# Patient Record
Sex: FEMALE | Race: WHITE | NOT HISPANIC OR LATINO
[De-identification: names, ages, dates, MRNs, and addresses within clinical notes are randomized per-mention and may not be internally consistent; named-entity substitution may affect disease eponyms.]

---

## 2017-03-01 RX ORDER — HYALURONATE SODIUM 20 MG/2 ML
20 SYRINGE (ML) INTRAARTICULAR
Qty: 2 | Refills: 0 | Status: ACTIVE | OUTPATIENT
Start: 2017-03-01

## 2017-03-06 ENCOUNTER — APPOINTMENT (OUTPATIENT)
Dept: ORTHOPEDIC SURGERY | Facility: CLINIC | Age: 82
End: 2017-03-06

## 2017-03-06 VITALS
WEIGHT: 172 LBS | HEART RATE: 77 BPM | SYSTOLIC BLOOD PRESSURE: 119 MMHG | DIASTOLIC BLOOD PRESSURE: 77 MMHG | HEIGHT: 62 IN | BODY MASS INDEX: 31.65 KG/M2

## 2017-03-13 ENCOUNTER — APPOINTMENT (OUTPATIENT)
Dept: ORTHOPEDIC SURGERY | Facility: CLINIC | Age: 82
End: 2017-03-13

## 2017-03-13 VITALS — HEART RATE: 83 BPM | DIASTOLIC BLOOD PRESSURE: 78 MMHG | SYSTOLIC BLOOD PRESSURE: 118 MMHG

## 2017-03-20 ENCOUNTER — APPOINTMENT (OUTPATIENT)
Dept: ORTHOPEDIC SURGERY | Facility: CLINIC | Age: 82
End: 2017-03-20

## 2017-03-20 VITALS — SYSTOLIC BLOOD PRESSURE: 122 MMHG | DIASTOLIC BLOOD PRESSURE: 78 MMHG | HEART RATE: 64 BPM

## 2018-01-30 ENCOUNTER — APPOINTMENT (OUTPATIENT)
Dept: OTOLARYNGOLOGY | Facility: CLINIC | Age: 83
End: 2018-01-30

## 2018-03-05 ENCOUNTER — APPOINTMENT (OUTPATIENT)
Dept: OTOLARYNGOLOGY | Facility: CLINIC | Age: 83
End: 2018-03-05
Payer: MEDICARE

## 2018-03-05 VITALS
HEART RATE: 70 BPM | DIASTOLIC BLOOD PRESSURE: 79 MMHG | WEIGHT: 172 LBS | BODY MASS INDEX: 31.65 KG/M2 | SYSTOLIC BLOOD PRESSURE: 127 MMHG | HEIGHT: 62 IN

## 2018-03-05 PROCEDURE — 69210 REMOVE IMPACTED EAR WAX UNI: CPT

## 2018-03-05 PROCEDURE — 99213 OFFICE O/P EST LOW 20 MIN: CPT | Mod: 25

## 2018-03-05 RX ORDER — SIMVASTATIN 5 MG/1
5 TABLET, FILM COATED ORAL
Qty: 90 | Refills: 0 | Status: ACTIVE | COMMUNITY
Start: 2017-10-09

## 2018-03-05 RX ORDER — DICLOFENAC SODIUM 3 G/100G
3 GEL TOPICAL
Qty: 200 | Refills: 0 | Status: ACTIVE | COMMUNITY
Start: 2017-12-27

## 2018-03-05 RX ORDER — POTASSIUM CHLORIDE 750 MG/1
10 TABLET, FILM COATED, EXTENDED RELEASE ORAL
Qty: 90 | Refills: 0 | Status: ACTIVE | COMMUNITY
Start: 2018-02-02

## 2018-03-05 RX ORDER — CEFDINIR 300 MG/1
300 CAPSULE ORAL
Qty: 20 | Refills: 0 | Status: ACTIVE | COMMUNITY
Start: 2017-09-04

## 2018-03-05 RX ORDER — DICLOFENAC SODIUM 20 MG/G
2 SOLUTION TOPICAL
Qty: 112 | Refills: 0 | Status: ACTIVE | COMMUNITY
Start: 2017-11-10

## 2018-10-01 ENCOUNTER — APPOINTMENT (OUTPATIENT)
Dept: ORTHOPEDIC SURGERY | Facility: CLINIC | Age: 83
End: 2018-10-01
Payer: MEDICARE

## 2018-10-01 VITALS
SYSTOLIC BLOOD PRESSURE: 114 MMHG | WEIGHT: 172 LBS | BODY MASS INDEX: 31.65 KG/M2 | HEIGHT: 62 IN | HEART RATE: 70 BPM | DIASTOLIC BLOOD PRESSURE: 80 MMHG

## 2018-10-01 PROCEDURE — 99213 OFFICE O/P EST LOW 20 MIN: CPT | Mod: 25

## 2018-10-01 PROCEDURE — 20610 DRAIN/INJ JOINT/BURSA W/O US: CPT | Mod: 50

## 2018-10-08 ENCOUNTER — APPOINTMENT (OUTPATIENT)
Dept: ORTHOPEDIC SURGERY | Facility: CLINIC | Age: 83
End: 2018-10-08
Payer: MEDICARE

## 2018-10-08 VITALS
SYSTOLIC BLOOD PRESSURE: 118 MMHG | DIASTOLIC BLOOD PRESSURE: 82 MMHG | BODY MASS INDEX: 31.65 KG/M2 | HEART RATE: 72 BPM | WEIGHT: 172 LBS | HEIGHT: 62 IN

## 2018-10-08 PROCEDURE — 20610 DRAIN/INJ JOINT/BURSA W/O US: CPT | Mod: 50

## 2018-10-16 ENCOUNTER — APPOINTMENT (OUTPATIENT)
Dept: ORTHOPEDIC SURGERY | Facility: CLINIC | Age: 83
End: 2018-10-16
Payer: MEDICARE

## 2018-10-16 PROCEDURE — 20610 DRAIN/INJ JOINT/BURSA W/O US: CPT | Mod: 50

## 2018-12-19 ENCOUNTER — APPOINTMENT (OUTPATIENT)
Dept: OTOLARYNGOLOGY | Facility: CLINIC | Age: 83
End: 2018-12-19
Payer: MEDICARE

## 2018-12-19 VITALS
WEIGHT: 172 LBS | BODY MASS INDEX: 31.65 KG/M2 | HEIGHT: 62 IN | SYSTOLIC BLOOD PRESSURE: 125 MMHG | DIASTOLIC BLOOD PRESSURE: 78 MMHG | HEART RATE: 75 BPM

## 2018-12-19 PROCEDURE — 69210 REMOVE IMPACTED EAR WAX UNI: CPT

## 2018-12-19 PROCEDURE — 99213 OFFICE O/P EST LOW 20 MIN: CPT | Mod: 25

## 2019-05-06 ENCOUNTER — APPOINTMENT (OUTPATIENT)
Dept: ORTHOPEDIC SURGERY | Facility: CLINIC | Age: 84
End: 2019-05-06
Payer: MEDICARE

## 2019-05-06 VITALS
BODY MASS INDEX: 31.47 KG/M2 | DIASTOLIC BLOOD PRESSURE: 66 MMHG | SYSTOLIC BLOOD PRESSURE: 124 MMHG | WEIGHT: 171 LBS | HEART RATE: 73 BPM | HEIGHT: 62 IN

## 2019-05-06 PROCEDURE — 99213 OFFICE O/P EST LOW 20 MIN: CPT | Mod: 25

## 2019-05-06 PROCEDURE — 20610 DRAIN/INJ JOINT/BURSA W/O US: CPT | Mod: 50

## 2019-05-06 NOTE — DISCUSSION/SUMMARY
[de-identified] : The patient has symptomatic osteoarthritis of both knees. I have discussed the pathology, natural history and treatment options with her. She would like to try a course of hyaluronic acid.\par Informed consent was obtained. Site and procedure were confirmed with the patient. Following a sterile prep the right knee was aspirated but no fluid was returned. Euflexxa was injected in the right knee without complication. Sterile dressing was applied. Instructions were given.\par Informed consent was obtained.Site and procedure were confirmed with the patient.  Following a sterile prep the left knee was aspirated but no fluid was returned. Euflexxa was injected in the left knee without complication. Sterile dressing was applied. Instructions were given.\par She will return in one week.

## 2019-05-06 NOTE — HISTORY OF PRESENT ILLNESS
[de-identified] : This patient returns for evaluation of her knees. She has had success in the past with hyaluronic acid injections and would like to try another course of treatment. She has bilateral knee pain. She denies lower extremity numbness or tingling. She denies buckling or locking of her knees.

## 2019-05-06 NOTE — PHYSICAL EXAM
[Normal] : Gait: normal [Ankle] : ankle 2+ and symmetric bilaterally [Knee] : patellar 2+ and symmetric bilaterally [LE] : Sensory: Intact in bilateral lower extremities [de-identified] : The patient has no respiratory distress. Mood and affect are normal. The patient is alert and oriented to person, place and time.\par The patient reports no pain with motion of her hips. There is mild swelling of both knees but no joint effusion. The collateral and cruciate ligaments are stable bilaterally. She has medial tenderness of both knees. Range of motion 0-100° right and left. The calves are soft and nontender. The skin is intact. There is no lymphedema. [DP] : dorsalis pedis 2+ and symmetric bilaterally

## 2019-05-13 ENCOUNTER — APPOINTMENT (OUTPATIENT)
Dept: ORTHOPEDIC SURGERY | Facility: CLINIC | Age: 84
End: 2019-05-13
Payer: MEDICARE

## 2019-05-13 VITALS
WEIGHT: 171 LBS | SYSTOLIC BLOOD PRESSURE: 118 MMHG | DIASTOLIC BLOOD PRESSURE: 75 MMHG | HEART RATE: 57 BPM | HEIGHT: 62 IN | BODY MASS INDEX: 31.47 KG/M2

## 2019-05-13 PROCEDURE — 20610 DRAIN/INJ JOINT/BURSA W/O US: CPT | Mod: 50

## 2019-05-20 ENCOUNTER — APPOINTMENT (OUTPATIENT)
Dept: ORTHOPEDIC SURGERY | Facility: CLINIC | Age: 84
End: 2019-05-20
Payer: MEDICARE

## 2019-05-20 VITALS
HEIGHT: 62 IN | HEART RATE: 73 BPM | DIASTOLIC BLOOD PRESSURE: 68 MMHG | SYSTOLIC BLOOD PRESSURE: 123 MMHG | WEIGHT: 171 LBS | BODY MASS INDEX: 31.47 KG/M2

## 2019-05-20 PROCEDURE — 20610 DRAIN/INJ JOINT/BURSA W/O US: CPT | Mod: 50

## 2019-09-05 ENCOUNTER — APPOINTMENT (OUTPATIENT)
Dept: OTOLARYNGOLOGY | Facility: CLINIC | Age: 84
End: 2019-09-05
Payer: MEDICARE

## 2019-09-05 VITALS
SYSTOLIC BLOOD PRESSURE: 117 MMHG | WEIGHT: 168 LBS | DIASTOLIC BLOOD PRESSURE: 68 MMHG | HEART RATE: 68 BPM | HEIGHT: 62 IN | BODY MASS INDEX: 30.91 KG/M2

## 2019-09-05 PROCEDURE — 69210 REMOVE IMPACTED EAR WAX UNI: CPT

## 2019-09-05 PROCEDURE — 99213 OFFICE O/P EST LOW 20 MIN: CPT | Mod: 25

## 2019-09-05 NOTE — ASSESSMENT
[FreeTextEntry1] : 88 y/o female w/ b/l SNHL and excessive wax who is here fir her routine debridement. SHe has been doing well.\par \par Wax-\par After informed verbal consent is obtained, cerumen is removed from the right and left  ear canal with a curette and suction.\par Rx: \par Debrox was prescribed and  is to be placed in both ears on a routine basis to keep them free of wax.\par Routine debridement suggested to keep the ears free of wax.\par \par bilateral sensorineural hearing loss-cleared for hearing aids\par \par Will f/u 6 months \par

## 2019-09-05 NOTE — PHYSICAL EXAM
[] : septum deviated bilaterally [Midline] : trachea located in midline position [Normal] : salivary glands are normal [de-identified] : bilat wax

## 2019-09-05 NOTE — REVIEW OF SYSTEMS
[Patient Intake Form Reviewed] : Patient intake form was reviewed [Hearing Loss] : hearing loss [As Noted in HPI] : as noted in HPI [Negative] : Endocrine

## 2019-09-05 NOTE — HISTORY OF PRESENT ILLNESS
[No] : patient does not have a  history of radiation therapy [Hearing Loss] : hearing loss [None] : The patient is currently asymptomatic. [de-identified] : 88 y/o female w/ hx of b/l hearing loss with associated severe cerumen impaction x yrs who is here for her routine wax debridement. She states she has been doing well. She has no change in her hearing. She has no ENT complaints today. Pt has no ear pain, ear drainage,  tinnitus, vertigo, nasal congestion, nasal discharge, epistaxis, sinus infections, facial pain, facial pressure, throat pain, dysphagia or fevers\par  [Tinnitus] : no tinnitus [Anxiety] : no anxiety [Dizziness] : no dizziness [Headache] : no headache [Otalgia] : no otalgia [Otorrhea] : no otorrhea [Vertigo] : no vertigo [Loud Noise Exposure] : no history of loud noise exposure [Smoking] : no smoking [Early Onset Hearing Loss] : no early onset hearing loss [Stroke] : no stroke [Ear Pain] : no ear pain [Ear Pressure] : no ear pressure [Diplopia] : no diplopia [Ear Fullness] : no ear fullness [Allergic Rhinitis] : no allergic rhinitis [Environmental Allergies] : no environmental allergies [Seasonal Allergies] : no seasonal allergies [Environmental Allergens] : no environmental allergens [Adenoidectomy] : no adenoidectomy [Asthma] : no asthma [Allergies] : no allergies [Neck Mass] : no neck mass [Neck Pain] : no neck pain [Chills] : no chills [Cold Intolerance] : no cold intolerance [Cough] : no cough [Fatigue] : no fatigue [Heat Intolerance] : no heat intolerance [Hyperthyroidism] : no hyperthyroidism [Sialadenitis] : no sialadenitis [Hodgkin Disease] : no hodgkin disease [Tobacco Use] : no tobacco use [Non-Hodgkin Lymphoma] : no non-hodgkin lymphoma [Alcohol Use] : no alcohol use [Graves Disease] : no graves disease [Thyroid Cancer] : no thyroid cancer

## 2019-09-05 NOTE — REASON FOR VISIT
[Subsequent Evaluation] : a subsequent evaluation for [Hearing Loss] : hearing loss [FreeTextEntry2] : wax

## 2019-10-03 ENCOUNTER — APPOINTMENT (OUTPATIENT)
Dept: ORTHOPEDIC SURGERY | Facility: CLINIC | Age: 84
End: 2019-10-03
Payer: MEDICARE

## 2019-10-03 VITALS
HEIGHT: 62 IN | WEIGHT: 168 LBS | BODY MASS INDEX: 30.91 KG/M2 | DIASTOLIC BLOOD PRESSURE: 87 MMHG | HEART RATE: 70 BPM | SYSTOLIC BLOOD PRESSURE: 148 MMHG

## 2019-10-03 PROCEDURE — 99214 OFFICE O/P EST MOD 30 MIN: CPT

## 2019-10-03 PROCEDURE — 73502 X-RAY EXAM HIP UNI 2-3 VIEWS: CPT | Mod: RT

## 2019-10-03 PROCEDURE — 72100 X-RAY EXAM L-S SPINE 2/3 VWS: CPT

## 2019-10-03 NOTE — REVIEW OF SYSTEMS
[Joint Stiffness] : joint stiffness [Joint Pain] : joint pain [Joint Swelling] : no joint swelling [Negative] : Heme/Lymph

## 2019-10-03 NOTE — PHYSICAL EXAM
[DP] : dorsalis pedis 2+ and symmetric bilaterally [PT] : posterior tibial 2+ and symmetric bilaterally [Normal] : Alert and in no acute distress [Poor Appearance] : well-appearing [Acute Distress] : not in acute distress [Obese] : not obese [de-identified] : AP and lateral x-rays of the right hip with AP x-ray of the pelvis demonstrates osteoarthritic changes of the right hip.  There are no fractures or dislocations.  AP and lateral x-rays of the lumbar spine demonstrate no acute fracture or dislocation.  There are degenerative changes with spondylolisthesis at L3-L4 [de-identified] : The patient has no respiratory distress. Mood and affect are normal. The patient is alert and oriented to person, place and time.\par Examination of the lumbar spine demonstrates tenderness to the right of the midline. There is mild muscle spasm. There is no deformity. Lumbar flexion is 90°, right lateral flexion 10° and left lateral flexion 10°. Straight leg raise test is negative. Lower extremity neurologic exam is intact with regard to sensation, motor function and deep tendon reflexes.\par She difficulty performing Trendelenburg.  There is pain with rotation of the right hip but not the left.  Strength is intact at both hips.  There is pain with knee range of motion bilaterally.  The calves are soft and nontender.  The skin is intact.  There is no lymphedema.

## 2019-10-03 NOTE — HISTORY OF PRESENT ILLNESS
[de-identified] : 89 year old female presents with one year of right buttock pain radiating to the right thigh, gradually worsening. The pain is constant. There was no injury or trauma. Years ago she was told she had a collapsed disc in her lumbar spine. She reports occasional pain in the right groin. Tylenol, ice and heat have not helped. Her granddaughter is getting  this month and she would like to be comfortable.

## 2019-10-03 NOTE — DISCUSSION/SUMMARY
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  I have discussed the pathology and natural history with her.  Treatment options were discussed in detail.  She will try a course of naproxen.  Medication risks have been reviewed.  If this is not helpful she could consider injection therapy for her hip.

## 2020-01-02 ENCOUNTER — APPOINTMENT (OUTPATIENT)
Dept: ORTHOPEDIC SURGERY | Facility: CLINIC | Age: 85
End: 2020-01-02
Payer: MEDICARE

## 2020-01-02 VITALS
SYSTOLIC BLOOD PRESSURE: 135 MMHG | DIASTOLIC BLOOD PRESSURE: 80 MMHG | BODY MASS INDEX: 30.91 KG/M2 | WEIGHT: 168 LBS | HEIGHT: 62 IN | HEART RATE: 80 BPM

## 2020-01-02 PROCEDURE — 73564 X-RAY EXAM KNEE 4 OR MORE: CPT | Mod: 50

## 2020-01-02 PROCEDURE — 20610 DRAIN/INJ JOINT/BURSA W/O US: CPT | Mod: 50

## 2020-01-02 PROCEDURE — 99213 OFFICE O/P EST LOW 20 MIN: CPT | Mod: 25

## 2020-01-02 NOTE — HISTORY OF PRESENT ILLNESS
[de-identified] : Pt is a 89 y/o female with bilateral knee pain.  She had Euflexxa injections in May which helped.  The knees started to become painful again about 1 month ago.  The knees ache constantly.  She has pain when she stands and walks.  It is difficult to stand from sitting.  She would like to start another series of Euflexxa today.  She takes Advil or Tylenol as needed for pain.

## 2020-01-02 NOTE — DISCUSSION/SUMMARY
[de-identified] : The patient has bilateral, symptomatic arthritis of both knees.  She has benefited in the past from hyaluronic acid injections and requests another series.  The risks, benefits and alternatives were discussed.  She wishes to proceed.\par Informed consent was obtained. Site and procedure were confirmed with the patient. Following a sterile prep the right knee was aspirated but no fluid was returned. Euflexxa was injected in the right knee without complication. Sterile dressing was applied. Instructions were given.\par Informed consent was obtained.Site and procedure were confirmed with the patient.  Following a sterile prep the left knee was aspirated but no fluid was returned. Euflexxa was injected in the left knee without complication. Sterile dressing was applied. Instructions were given.\par She will return in 1 week.

## 2020-01-02 NOTE — PHYSICAL EXAM
[LE] : Sensory: Intact in bilateral lower extremities [DP] : dorsalis pedis 2+ and symmetric bilaterally [PT] : posterior tibial 2+ and symmetric bilaterally [Normal] : Alert and in no acute distress [Poor Appearance] : well-appearing [Acute Distress] : not in acute distress [Obese] : not obese [de-identified] : The patient has no respiratory distress. Mood and affect are normal. The patient is alert and oriented to person, place and time.\par The patient experiences no pain with motion of the hips.  There is no tenderness of her hips.  She has anteromedial tenderness of both knees.  Quadriceps and hamstring function are intact.  The collateral and cruciate ligaments are stable.  Range of motion 0 to 100 degrees bilaterally.  The calves are soft and nontender.  The skin is intact.  There is no lymphedema. [de-identified] : AP, lateral, tunnel and sunrise x-rays of both knees taken today demonstrate no fracture or dislocation.  There are degenerative changes.

## 2020-01-09 ENCOUNTER — APPOINTMENT (OUTPATIENT)
Dept: ORTHOPEDIC SURGERY | Facility: CLINIC | Age: 85
End: 2020-01-09
Payer: MEDICARE

## 2020-01-09 VITALS
HEIGHT: 62 IN | HEART RATE: 69 BPM | DIASTOLIC BLOOD PRESSURE: 68 MMHG | WEIGHT: 168 LBS | BODY MASS INDEX: 30.91 KG/M2 | SYSTOLIC BLOOD PRESSURE: 116 MMHG

## 2020-01-09 PROCEDURE — 20610 DRAIN/INJ JOINT/BURSA W/O US: CPT | Mod: 50

## 2020-01-09 NOTE — PHYSICAL EXAM
[PT] : posterior tibial 2+ and symmetric bilaterally [DP] : dorsalis pedis 2+ and symmetric bilaterally [LE] : 5/5 motor strength in bilateral lower extremities [Normal] : Alert and in no acute distress [Poor Appearance] : well-appearing [de-identified] : The patient has no respiratory distress. Mood and affect are normal. The patient is alert and oriented to person, place and time.\par The patient experiences no pain with motion of the hips.  There is no tenderness of her hips.  She has anteromedial tenderness of both knees.  Quadriceps and hamstring function are intact.  The collateral and cruciate ligaments are stable.  Range of motion 0 to 100 degrees bilaterally.  The calves are soft and nontender.  The skin is intact.  There is no lymphedema. [Acute Distress] : not in acute distress [Obese] : not obese

## 2020-01-09 NOTE — DISCUSSION/SUMMARY
[de-identified] : Informed consent was obtained. Site and procedure were confirmed with the patient. Following a sterile prep the right knee was aspirated but no fluid was returned. Euflexxa was injected in the right knee without complication. Sterile dressing was applied. Instructions were given.\par Informed consent was obtained.Site and procedure were confirmed with the patient.  Following a sterile prep the left knee was aspirated but no fluid was returned. Euflexxa was injected in the left knee without complication. Sterile dressing was applied. Instructions were given.\par She will return in 1 week.

## 2020-01-09 NOTE — HISTORY OF PRESENT ILLNESS
[de-identified] : The patient presents for second hyaluronic acid injection for each knee.  The first injections were well tolerated.  She still experiences bilateral knee pain.

## 2020-01-16 ENCOUNTER — APPOINTMENT (OUTPATIENT)
Dept: ORTHOPEDIC SURGERY | Facility: CLINIC | Age: 85
End: 2020-01-16
Payer: MEDICARE

## 2020-01-16 VITALS
HEART RATE: 101 BPM | WEIGHT: 168 LBS | BODY MASS INDEX: 30.91 KG/M2 | DIASTOLIC BLOOD PRESSURE: 70 MMHG | SYSTOLIC BLOOD PRESSURE: 112 MMHG | HEIGHT: 62 IN

## 2020-01-16 PROCEDURE — 20610 DRAIN/INJ JOINT/BURSA W/O US: CPT | Mod: 50

## 2020-01-16 NOTE — DISCUSSION/SUMMARY
[de-identified] : Informed consent was obtained. Site and procedure were confirmed with the patient. Following a sterile prep the right knee was aspirated but no fluid was returned. Euflexxa was injected in the right knee without complication. Sterile dressing was applied. Instructions were given.\par Informed consent was obtained.Site and procedure were confirmed with the patient.  Following a sterile prep the left knee was aspirated but no fluid was returned. Euflexxa was injected in the left knee without complication. Sterile dressing was applied. Instructions were given.\par She will return as needed.

## 2020-01-16 NOTE — HISTORY OF PRESENT ILLNESS
[de-identified] : The patient presents for her third hyaluronic acid injection for each knee.  The previous injections were well tolerated.

## 2020-01-16 NOTE — PHYSICAL EXAM
[LE] : 5/5 motor strength in bilateral lower extremities [DP] : dorsalis pedis 2+ and symmetric bilaterally [PT] : posterior tibial 2+ and symmetric bilaterally [Normal] : Alert and in no acute distress [Poor Appearance] : well-appearing [Acute Distress] : not in acute distress [Obese] : not obese [de-identified] : The patient has no respiratory distress. Mood and affect are normal. The patient is alert and oriented to person, place and time.\par The patient experiences no pain with motion of the hips.  There is no tenderness of her hips.  She has anteromedial tenderness of both knees.  Quadriceps and hamstring function are intact.  The collateral and cruciate ligaments are stable.  Range of motion 0 to 100 degrees bilaterally.  The calves are soft and nontender.  The skin is intact.  There is no lymphedema.

## 2020-03-05 ENCOUNTER — APPOINTMENT (OUTPATIENT)
Dept: OTOLARYNGOLOGY | Facility: CLINIC | Age: 85
End: 2020-03-05

## 2020-10-19 ENCOUNTER — APPOINTMENT (OUTPATIENT)
Dept: ORTHOPEDIC SURGERY | Facility: CLINIC | Age: 85
End: 2020-10-19
Payer: MEDICARE

## 2020-10-19 VITALS
SYSTOLIC BLOOD PRESSURE: 145 MMHG | TEMPERATURE: 97.9 F | WEIGHT: 168 LBS | HEART RATE: 91 BPM | HEIGHT: 62 IN | BODY MASS INDEX: 30.91 KG/M2 | DIASTOLIC BLOOD PRESSURE: 77 MMHG

## 2020-10-19 PROCEDURE — 20610 DRAIN/INJ JOINT/BURSA W/O US: CPT | Mod: 50

## 2020-10-24 ENCOUNTER — TRANSCRIPTION ENCOUNTER (OUTPATIENT)
Age: 85
End: 2020-10-24

## 2020-10-26 ENCOUNTER — APPOINTMENT (OUTPATIENT)
Dept: ORTHOPEDIC SURGERY | Facility: CLINIC | Age: 85
End: 2020-10-26
Payer: MEDICARE

## 2020-10-26 VITALS
SYSTOLIC BLOOD PRESSURE: 139 MMHG | HEART RATE: 88 BPM | WEIGHT: 168 LBS | TEMPERATURE: 97.8 F | DIASTOLIC BLOOD PRESSURE: 75 MMHG | BODY MASS INDEX: 30.91 KG/M2 | HEIGHT: 62 IN

## 2020-10-26 PROCEDURE — 20610 DRAIN/INJ JOINT/BURSA W/O US: CPT | Mod: 50

## 2020-10-30 ENCOUNTER — NON-APPOINTMENT (OUTPATIENT)
Age: 85
End: 2020-10-30

## 2020-10-30 ENCOUNTER — APPOINTMENT (OUTPATIENT)
Dept: OPHTHALMOLOGY | Facility: CLINIC | Age: 85
End: 2020-10-30
Payer: MEDICARE

## 2020-10-30 PROCEDURE — 92014 COMPRE OPH EXAM EST PT 1/>: CPT

## 2020-11-02 ENCOUNTER — APPOINTMENT (OUTPATIENT)
Dept: ORTHOPEDIC SURGERY | Facility: CLINIC | Age: 85
End: 2020-11-02
Payer: MEDICARE

## 2020-11-02 VITALS
BODY MASS INDEX: 30.91 KG/M2 | WEIGHT: 168 LBS | TEMPERATURE: 97.6 F | HEIGHT: 62 IN | SYSTOLIC BLOOD PRESSURE: 156 MMHG | HEART RATE: 98 BPM | DIASTOLIC BLOOD PRESSURE: 82 MMHG

## 2020-11-02 PROCEDURE — 20610 DRAIN/INJ JOINT/BURSA W/O US: CPT | Mod: 50

## 2020-11-02 NOTE — DISCUSSION/SUMMARY
[de-identified] : Informed consent was obtained. Site and procedure were confirmed with the patient. Following a sterile prep the right knee was aspirated but no fluid was returned. Euflexxa was injected in the right knee without complication. Sterile dressing was applied. Instructions were given.\par Informed consent was obtained.Site and procedure were confirmed with the patient.  Following a sterile prep the left knee was aspirated but no fluid was returned. Euflexxa was injected in the left knee without complication. Sterile dressing was applied. Instructions were given.\par She will return as needed.

## 2020-11-02 NOTE — HISTORY OF PRESENT ILLNESS
[de-identified] : The patient returns for reevaluation of her knees.  She is here for the third hyaluronic acid injection for both knees.  Previous injections were well-tolerated.  She has been started on Mobic by her neurologist for spinal stenosis.

## 2020-11-02 NOTE — PHYSICAL EXAM
[LE] : Sensory: Intact in bilateral lower extremities [DP] : dorsalis pedis 2+ and symmetric bilaterally [PT] : posterior tibial 2+ and symmetric bilaterally [Normal] : Alert and in no acute distress [Poor Appearance] : well-appearing [Acute Distress] : not in acute distress [Obese] : not obese [de-identified] : The patient has no respiratory distress. Mood and affect are normal. The patient is alert and oriented to person, place and time.\par The patient experiences back pain and right leg pain with lumbar spine motion.\par The patient experiences no pain with motion of the hips.  There is no tenderness of her hips.  She has anteromedial tenderness of both knees.  Quadriceps and hamstring function are intact.  The collateral and cruciate ligaments are stable.  Range of motion 0 to 100 degrees bilaterally.  The calves are soft and nontender.  The skin is intact.  There is no lymphedema.

## 2021-07-30 ENCOUNTER — APPOINTMENT (OUTPATIENT)
Dept: ORTHOPEDIC SURGERY | Facility: CLINIC | Age: 86
End: 2021-07-30
Payer: MEDICARE

## 2021-07-30 VITALS
HEART RATE: 82 BPM | WEIGHT: 168 LBS | HEIGHT: 62 IN | BODY MASS INDEX: 30.91 KG/M2 | SYSTOLIC BLOOD PRESSURE: 122 MMHG | DIASTOLIC BLOOD PRESSURE: 76 MMHG

## 2021-07-30 PROCEDURE — 99213 OFFICE O/P EST LOW 20 MIN: CPT

## 2021-07-30 NOTE — HISTORY OF PRESENT ILLNESS
[de-identified] : 89 year old female c/o pain in the lower back radiating to the right hip and thigh, worse over the last 3 months. She was diagnosed with spinal stenosis one year ago and was referred by her neurologist to pain management. She has had 3 LEYLA, most recently 7/14, with minimal relief. Her right leg castro frequently. She is taking Tylenol or Advil with minimal relief. She has difficulty getting out of a chair and difficulty climbing steps. She feels that her QOL has diminished.

## 2021-07-30 NOTE — DISCUSSION/SUMMARY
[de-identified] : The patient feels unstable when she walks.  This may be secondary to her spinal stenosis, her right knee arthritis or generalized lower extremity weakness.  She walks with a cane for support.  She has had epidural injections but is not sure they helped her.  I recommend consultation with the spine service.

## 2021-07-30 NOTE — PHYSICAL EXAM
[Cane] : ambulates with cane [Normal] : Alert and in no acute distress [Poor Appearance] : well-appearing [Acute Distress] : not in acute distress [de-identified] : The patient has no respiratory distress. Mood and affect are normal. The patient is alert and oriented to person, place and time.\par The patient has tenderness in the right lower paralumbar region.  She experiences pain with lumbar flexion.  Straight leg raise does not create leg pain but does create right buttock pain.  There is mild pain with rotation of the right hip.  There is no hip tenderness.  She has quadriceps weakness on the right.  She has mild pain with motion of both knees.  The calves are soft and nontender.  The skin is intact.  There is no lymphedema.

## 2021-08-10 ENCOUNTER — APPOINTMENT (OUTPATIENT)
Dept: ORTHOPEDIC SURGERY | Facility: CLINIC | Age: 86
End: 2021-08-10
Payer: MEDICARE

## 2021-08-10 VITALS
WEIGHT: 160 LBS | BODY MASS INDEX: 29.44 KG/M2 | DIASTOLIC BLOOD PRESSURE: 77 MMHG | HEIGHT: 62 IN | HEART RATE: 90 BPM | SYSTOLIC BLOOD PRESSURE: 128 MMHG

## 2021-08-10 PROCEDURE — 99215 OFFICE O/P EST HI 40 MIN: CPT

## 2021-08-10 PROCEDURE — 72170 X-RAY EXAM OF PELVIS: CPT

## 2021-08-10 RX ORDER — IBUPROFEN 800 MG/1
800 TABLET, FILM COATED ORAL
Qty: 90 | Refills: 0 | Status: ACTIVE | COMMUNITY
Start: 2021-08-10 | End: 1900-01-01

## 2021-08-11 NOTE — DISCUSSION/SUMMARY
[Medication Risks Reviewed] : Medication risks reviewed [de-identified] : Her symptoms are secondary to osteoarthritis in the right hip.  We will pursue a trial of ibuprofen 800 mg 3 times a day as a nonsteroidal anti-inflammatory.  She is asked that it be sent to her mail order.  I have asked her to make a follow-up appointment in 4-1/2 weeks.  If she has seen no improvement in 3-1/2 weeks I have asked her to cancel that appointment and make an appointment to see Dr. Daniel as it may be the only solution to her problem is a right total hip replacement.  If she has made improvement on the ibuprofen she will follow-up with me as planned.  She will call if there are problems with the medication or worsening of her symptoms.

## 2021-08-11 NOTE — PHYSICAL EXAM
[de-identified] : She is fully alert and oriented with a normal mood and affect.  She is in no acute distress as I take the history.  With ambulation she favors her right leg and there is a mildly antalgic component to her gait.  She can tiptoe and heel walk.  There are no cutaneous abnormalities or palpable bony defects of the spine.  There is no evidence of shortness of breath or respiratory distress.  There is no paravertebral muscle spasm, sciatic notch tenderness or trochanteric tenderness.  Forward flexion of the spine is to 60 degrees without pain.  Her lower extremity neurological examination revealed trace symmetrical reflexes.  Motor power is normal manual testing in all lower extremity groups and sensation is normal to light touch in all dermatomes.  Straight leg raising is negative to 90 degrees in the sitting position.  The pain in her buttock, groin and anterior thigh is reproduced with range of motion of her right hip.  Left hip range of motion is full and painless.  Vascular examination reveals some mild superficial varicosities.  There is no lymphedema.  There are no cutaneous abnormalities of the upper or lower extremities.  Her upper extremities are normal to inspection and her elbows have a full and painless range of motion with normal motor power normal stability. [de-identified] : I reviewed her prior x-ray of the pelvis from 2012 and the hips were normal.  X-ray of the pelvis in October 2019 revealed some mild osteoarthritis in the right hip.  AP of the pelvis today reveals marked osteoarthritis in the right hip with collapse of the femoral head.

## 2021-08-11 NOTE — REASON FOR VISIT
[Initial Visit] : an initial visit for [Back Pain] : back pain [Radiculopathy] : radiculopathy [FreeTextEntry2] : Osteoarthritis right hip

## 2021-08-11 NOTE — HISTORY OF PRESENT ILLNESS
[de-identified] : This sharp and pleasant 89-year-old woman has had several months of pain in the right buttock radiating to the groin and the anterolateral thigh to the knee but not below.  She has been diagnosed with spinal stenosis and has had epidurals with minimal relief.  She has minimal lower back pain.  There is no Valsalva effect.  She has been limping.  She has some chronic numbness and paresthesias secondary to neuropathy in her lower legs.  She has been diabetic for the last 5 years but is not on any diabetic medication.  She is on medication for hypertension.  She has reflux symptoms that are well controlled with omeprazole.  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  She has been limping and feels like her right hip can give way.  She is ambulating with a cane. [Pain Location] : pain [Worsening] : worsening [8] : a maximum pain level of 8/10

## 2021-08-31 ENCOUNTER — NON-APPOINTMENT (OUTPATIENT)
Age: 86
End: 2021-08-31

## 2021-09-03 ENCOUNTER — NON-APPOINTMENT (OUTPATIENT)
Age: 86
End: 2021-09-03

## 2021-09-03 ENCOUNTER — APPOINTMENT (OUTPATIENT)
Dept: OPHTHALMOLOGY | Facility: CLINIC | Age: 86
End: 2021-09-03
Payer: MEDICARE

## 2021-09-03 PROCEDURE — 92014 COMPRE OPH EXAM EST PT 1/>: CPT

## 2021-09-08 ENCOUNTER — APPOINTMENT (OUTPATIENT)
Dept: ORTHOPEDIC SURGERY | Facility: CLINIC | Age: 86
End: 2021-09-08

## 2021-09-17 ENCOUNTER — APPOINTMENT (OUTPATIENT)
Dept: ORTHOPEDIC SURGERY | Facility: CLINIC | Age: 86
End: 2021-09-17
Payer: MEDICARE

## 2021-09-17 VITALS
WEIGHT: 160 LBS | SYSTOLIC BLOOD PRESSURE: 124 MMHG | BODY MASS INDEX: 29.44 KG/M2 | DIASTOLIC BLOOD PRESSURE: 69 MMHG | HEIGHT: 62 IN | HEART RATE: 79 BPM

## 2021-09-17 PROCEDURE — 99213 OFFICE O/P EST LOW 20 MIN: CPT

## 2021-09-20 ENCOUNTER — RESULT REVIEW (OUTPATIENT)
Age: 86
End: 2021-09-20

## 2021-09-20 ENCOUNTER — APPOINTMENT (OUTPATIENT)
Dept: RADIOLOGY | Facility: CLINIC | Age: 86
End: 2021-09-20
Payer: MEDICARE

## 2021-09-20 ENCOUNTER — OUTPATIENT (OUTPATIENT)
Dept: OUTPATIENT SERVICES | Facility: HOSPITAL | Age: 86
LOS: 1 days | End: 2021-09-20
Payer: MEDICARE

## 2021-09-20 DIAGNOSIS — M16.11 UNILATERAL PRIMARY OSTEOARTHRITIS, RIGHT HIP: ICD-10-CM

## 2021-09-20 PROCEDURE — 27093 INJECTION FOR HIP X-RAY: CPT | Mod: RT

## 2021-09-20 PROCEDURE — 73525 CONTRAST X-RAY OF HIP: CPT

## 2021-09-20 PROCEDURE — 27093 INJECTION FOR HIP X-RAY: CPT

## 2021-09-20 PROCEDURE — 73525 CONTRAST X-RAY OF HIP: CPT | Mod: 26,RT

## 2021-09-28 ENCOUNTER — NON-APPOINTMENT (OUTPATIENT)
Age: 86
End: 2021-09-28

## 2021-10-05 ENCOUNTER — APPOINTMENT (OUTPATIENT)
Dept: OPHTHALMOLOGY | Facility: CLINIC | Age: 86
End: 2021-10-05
Payer: MEDICARE

## 2021-10-05 ENCOUNTER — NON-APPOINTMENT (OUTPATIENT)
Age: 86
End: 2021-10-05

## 2021-10-05 PROCEDURE — 92083 EXTENDED VISUAL FIELD XM: CPT

## 2021-10-05 PROCEDURE — 99214 OFFICE O/P EST MOD 30 MIN: CPT

## 2021-10-11 RX ORDER — CELECOXIB 100 MG/1
100 CAPSULE ORAL TWICE DAILY
Qty: 60 | Refills: 2 | Status: ACTIVE | COMMUNITY
Start: 2021-10-11 | End: 1900-01-01

## 2021-10-22 ENCOUNTER — APPOINTMENT (OUTPATIENT)
Dept: ORTHOPEDIC SURGERY | Facility: CLINIC | Age: 86
End: 2021-10-22
Payer: MEDICARE

## 2021-10-22 VITALS
DIASTOLIC BLOOD PRESSURE: 86 MMHG | HEIGHT: 62 IN | BODY MASS INDEX: 29.44 KG/M2 | WEIGHT: 160 LBS | SYSTOLIC BLOOD PRESSURE: 142 MMHG | HEART RATE: 83 BPM

## 2021-10-22 DIAGNOSIS — M47.816 SPONDYLOSIS W/OUT MYELOPATHY OR RADICULOPATHY, LUMBAR REGION: ICD-10-CM

## 2021-10-22 PROCEDURE — 72100 X-RAY EXAM L-S SPINE 2/3 VWS: CPT

## 2021-10-22 PROCEDURE — 99213 OFFICE O/P EST LOW 20 MIN: CPT

## 2021-10-22 NOTE — HISTORY OF PRESENT ILLNESS
[de-identified] : The patient returns for reevaluation. She is experiencing pain in her right low back and buttock area. She has been taking Celebrex just for 3 or 4 days. She is unsure if the Celebrex has been helping. She denies radiation down her legs. She denies lower extremity numbness or tingling.

## 2021-10-22 NOTE — DISCUSSION/SUMMARY
[de-identified] : The patient has osteoarthritis of the right hip. The patient has lumbar spondylosis. I think her pain is multifactorial. It is difficult to be sure how much of her pain is coming from each condition. We discussed total hip replacement as the ultimate treatment for right hip osteoarthritis. There is some concern based on her age. She will try a longer course of Celebrex. She will let me know how she is doing in 3 weeks. If she does not have pain relief she might try a different NSAID. Ultimately she may require hip replacement surgery if she can withstand the surgery medically.

## 2021-10-22 NOTE — PHYSICAL EXAM
[Slightly Antalgic] : slightly antalgic [Walker] : ambulates with walker [LE] : Sensory: Intact in bilateral lower extremities [DP] : dorsalis pedis 2+ and symmetric bilaterally [Normal] : Alert and in no acute distress [Poor Appearance] : well-appearing [Acute Distress] : not in acute distress [de-identified] : The patient has no respiratory distress. Mood and affect are normal. The patient is alert and oriented to person, place and time.\par The patient has tenderness in the right lower paralumbar region.  She experiences pain with lumbar flexion.  Straight leg raise does not create leg pain but does create right buttock pain.  There is  pain with rotation of the right hip.  There is no hip tenderness.  She has quadriceps weakness on the right.  She has mild pain with motion of both knees.  The calves are soft and nontender.  The skin is intact.  There is no lymphedema. [de-identified] : AP and lateral x-rays of the lumbar spine demonstrate degenerative changes with spondylolisthesis at L4-L5. Previously taken x-rays of the right hip demonstrate moderate to severe osteoarthritis.

## 2021-11-05 ENCOUNTER — NON-APPOINTMENT (OUTPATIENT)
Age: 86
End: 2021-11-05

## 2021-11-11 ENCOUNTER — APPOINTMENT (OUTPATIENT)
Dept: ORTHOPEDIC SURGERY | Facility: CLINIC | Age: 86
End: 2021-11-11
Payer: MEDICARE

## 2021-11-11 VITALS — BODY MASS INDEX: 29.08 KG/M2 | WEIGHT: 158 LBS | HEIGHT: 62 IN

## 2021-11-11 PROCEDURE — 99215 OFFICE O/P EST HI 40 MIN: CPT

## 2021-11-18 ENCOUNTER — OUTPATIENT (OUTPATIENT)
Dept: OUTPATIENT SERVICES | Facility: HOSPITAL | Age: 86
LOS: 1 days | End: 2021-11-18
Payer: MEDICARE

## 2021-11-18 VITALS
RESPIRATION RATE: 16 BRPM | WEIGHT: 156.09 LBS | OXYGEN SATURATION: 96 % | HEIGHT: 63 IN | HEART RATE: 88 BPM | TEMPERATURE: 98 F | SYSTOLIC BLOOD PRESSURE: 110 MMHG | DIASTOLIC BLOOD PRESSURE: 72 MMHG

## 2021-11-18 DIAGNOSIS — Z29.9 ENCOUNTER FOR PROPHYLACTIC MEASURES, UNSPECIFIED: ICD-10-CM

## 2021-11-18 DIAGNOSIS — E89.0 POSTPROCEDURAL HYPOTHYROIDISM: Chronic | ICD-10-CM

## 2021-11-18 DIAGNOSIS — Z98.890 OTHER SPECIFIED POSTPROCEDURAL STATES: Chronic | ICD-10-CM

## 2021-11-18 DIAGNOSIS — Z01.818 ENCOUNTER FOR OTHER PREPROCEDURAL EXAMINATION: ICD-10-CM

## 2021-11-18 DIAGNOSIS — M16.11 UNILATERAL PRIMARY OSTEOARTHRITIS, RIGHT HIP: ICD-10-CM

## 2021-11-18 LAB
ANION GAP SERPL CALC-SCNC: 15 MMOL/L — SIGNIFICANT CHANGE UP (ref 5–17)
BLD GP AB SCN SERPL QL: NEGATIVE — SIGNIFICANT CHANGE UP
BUN SERPL-MCNC: 24 MG/DL — HIGH (ref 7–23)
CALCIUM SERPL-MCNC: 9.8 MG/DL — SIGNIFICANT CHANGE UP (ref 8.4–10.5)
CHLORIDE SERPL-SCNC: 98 MMOL/L — SIGNIFICANT CHANGE UP (ref 96–108)
CO2 SERPL-SCNC: 24 MMOL/L — SIGNIFICANT CHANGE UP (ref 22–31)
CREAT SERPL-MCNC: 0.73 MG/DL — SIGNIFICANT CHANGE UP (ref 0.5–1.3)
GLUCOSE SERPL-MCNC: 160 MG/DL — HIGH (ref 70–99)
HCT VFR BLD CALC: 43.7 % — SIGNIFICANT CHANGE UP (ref 34.5–45)
HGB BLD-MCNC: 14.2 G/DL — SIGNIFICANT CHANGE UP (ref 11.5–15.5)
MCHC RBC-ENTMCNC: 29.8 PG — SIGNIFICANT CHANGE UP (ref 27–34)
MCHC RBC-ENTMCNC: 32.5 GM/DL — SIGNIFICANT CHANGE UP (ref 32–36)
MCV RBC AUTO: 91.6 FL — SIGNIFICANT CHANGE UP (ref 80–100)
NRBC # BLD: 0 /100 WBCS — SIGNIFICANT CHANGE UP (ref 0–0)
PLATELET # BLD AUTO: 263 K/UL — SIGNIFICANT CHANGE UP (ref 150–400)
POTASSIUM SERPL-MCNC: 3.4 MMOL/L — LOW (ref 3.5–5.3)
POTASSIUM SERPL-SCNC: 3.4 MMOL/L — LOW (ref 3.5–5.3)
RBC # BLD: 4.77 M/UL — SIGNIFICANT CHANGE UP (ref 3.8–5.2)
RBC # FLD: 12.9 % — SIGNIFICANT CHANGE UP (ref 10.3–14.5)
RH IG SCN BLD-IMP: POSITIVE — SIGNIFICANT CHANGE UP
SODIUM SERPL-SCNC: 137 MMOL/L — SIGNIFICANT CHANGE UP (ref 135–145)
WBC # BLD: 9.61 K/UL — SIGNIFICANT CHANGE UP (ref 3.8–10.5)
WBC # FLD AUTO: 9.61 K/UL — SIGNIFICANT CHANGE UP (ref 3.8–10.5)

## 2021-11-18 PROCEDURE — 36415 COLL VENOUS BLD VENIPUNCTURE: CPT

## 2021-11-18 PROCEDURE — 87641 MR-STAPH DNA AMP PROBE: CPT

## 2021-11-18 PROCEDURE — 85027 COMPLETE CBC AUTOMATED: CPT

## 2021-11-18 PROCEDURE — 86901 BLOOD TYPING SEROLOGIC RH(D): CPT

## 2021-11-18 PROCEDURE — 86850 RBC ANTIBODY SCREEN: CPT

## 2021-11-18 PROCEDURE — 80048 BASIC METABOLIC PNL TOTAL CA: CPT

## 2021-11-18 PROCEDURE — 86900 BLOOD TYPING SEROLOGIC ABO: CPT

## 2021-11-18 PROCEDURE — 83036 HEMOGLOBIN GLYCOSYLATED A1C: CPT

## 2021-11-18 PROCEDURE — G0463: CPT

## 2021-11-18 PROCEDURE — 87640 STAPH A DNA AMP PROBE: CPT

## 2021-11-18 RX ORDER — SODIUM CHLORIDE 9 MG/ML
3 INJECTION INTRAMUSCULAR; INTRAVENOUS; SUBCUTANEOUS EVERY 8 HOURS
Refills: 0 | Status: DISCONTINUED | OUTPATIENT
Start: 2021-11-30 | End: 2021-11-30

## 2021-11-18 RX ORDER — CEFAZOLIN SODIUM 1 G
2000 VIAL (EA) INJECTION ONCE
Refills: 0 | Status: DISCONTINUED | OUTPATIENT
Start: 2021-11-30 | End: 2021-12-03

## 2021-11-18 RX ORDER — CHLORHEXIDINE GLUCONATE 213 G/1000ML
1 SOLUTION TOPICAL ONCE
Refills: 0 | Status: DISCONTINUED | OUTPATIENT
Start: 2021-11-30 | End: 2021-11-30

## 2021-11-18 RX ORDER — LIDOCAINE HCL 20 MG/ML
0.2 VIAL (ML) INJECTION ONCE
Refills: 0 | Status: DISCONTINUED | OUTPATIENT
Start: 2021-11-30 | End: 2021-11-30

## 2021-11-18 NOTE — H&P PST ADULT - ASSESSMENT
CHIPI VTE 2.0 SCORE [CLOT updated 2019]    AGE RELATED RISK FACTORS                                                       MOBILITY RELATED FACTORS  [ ] Age 41-60 years                                            (1 Point)                    [ ] Bed rest                                                        (1 Point)  [ ] Age: 61-74 years                                           (2 Points)                  [ ] Plaster cast                                                   (2 Points)  [ 3 Age= 75 years                                              (3 Points)                    [ ] Bed bound for more than 72 hours                 (2 Points)    DISEASE RELATED RISK FACTORS                                               GENDER SPECIFIC FACTORS  [ ] Edema in the lower extremities                       (1 Point)              [ ] Pregnancy                                                     (1 Point)  [ ] Varicose veins                                               (1 Point)                     [ ] Post-partum < 6 weeks                                   (1 Point)             [1 ] BMI > 25 Kg/m2                                            (1 Point)                     [ ] Hormonal therapy  or oral contraception          (1 Point)                 [ ] Sepsis (in the previous month)                        (1 Point)               [ ] History of pregnancy complications                 (1 point)  [ ] Pneumonia or serious lung disease                                               [ ] Unexplained or recurrent                     (1 Point)           (in the previous month)                               (1 Point)  [ ] Abnormal pulmonary function test                     (1 Point)                 SURGERY RELATED RISK FACTORS  [ ] Acute myocardial infarction                              (1 Point)               [ ]  Section                                             (1 Point)  [ ] Congestive heart failure (in the previous month)  (1 Point)      [ ] Minor surgery                                                  (1 Point)   [ ] Inflammatory bowel disease                             (1 Point)               [ ] Arthroscopic surgery                                        (2 Points)  [ ] Central venous access                                      (2 Points)                [ ] General surgery lasting more than 45 minutes (2 points)  [ ] Malignancy- Present or previous                   (2 Points)                [ 5] Elective arthroplasty                                         (5 points)    [ ] Stroke (in the previous month)                          (5 Points)                                                                                                                                                           HEMATOLOGY RELATED FACTORS                                                 TRAUMA RELATED RISK FACTORS  [ ] Prior episodes of VTE                                     (3 Points)                [ ] Fracture of the hip, pelvis, or leg                       (5 Points)  [ ] Positive family history for VTE                         (3 Points)             [ ] Acute spinal cord injury (in the previous month)  (5 Points)  [ ] Prothrombin 97998 A                                     (3 Points)               [ ] Paralysis  (less than 1 month)                             (5 Points)  [ ] Factor V Leiden                                             (3 Points)                  [ ] Multiple Trauma within 1 month                        (5 Points)  [ ] Lupus anticoagulants                                     (3 Points)                                                           [ ] Anticardiolipin antibodies                               (3 Points)                                                       [ ] High homocysteine in the blood                      (3 Points)                                             [ ] Other congenital or acquired thrombophilia      (3 Points)                                                [ ] Heparin induced thrombocytopenia                  (3 Points)                                     Total Score [       9   ]

## 2021-11-18 NOTE — H&P PST ADULT - NSICDXPASTMEDICALHX_GEN_ALL_CORE_FT
PAST MEDICAL HISTORY:  GERD (gastroesophageal reflux disease)     History of prediabetes     HLD (hyperlipidemia)     HTN (hypertension)     Hypothyroid     Monoclonal gammopathy "diagnosed years ago on a blood test" - asymptomatic per pt    OA (osteoarthritis)

## 2021-11-18 NOTE — H&P PST ADULT - PROBLEM SELECTOR PLAN 1
RIGHT TOTAL HIP ARTHROPLASTY  DIRECT ANTERIOR APPROACH  Pre-op education provided - all questions answered   Chlorhex soap & instructions given  CBC, BMP, Ha1c, T&S, MRSA/MSSA swab sent in PST  PMD advised pt to hold aspirin prior to surgery - already stopped  Hold fish oil 7-10 days preop

## 2021-11-18 NOTE — H&P PST ADULT - NSANTHOSAYNRD_GEN_A_CORE
No. KG screening performed.  STOP BANG Legend: 0-2 = LOW Risk; 3-4 = INTERMEDIATE Risk; 5-8 = HIGH Risk

## 2021-11-18 NOTE — H&P PST ADULT - HISTORY OF PRESENT ILLNESS
***Covid test scheduled for 11/16/2021 at [facility].     89 YO F PMH HTN, HLD, hypothyroid, c/o worsening right hip pain, presents to PST for RIGHT TOTAL HIP ARTHROPLASTY, DIRECT ANTERIOR APPROACH 11/30/2021. Denies any palpitations, SOB, N/V, Covid symptoms (fever, chills, cough) or exposure.     ***Covid test scheduled for 11/16/2021 at [facility].     89 YO F PMH HTN, HLD, hypothyroid, c/o worsening right hip pain, presents to PST for RIGHT TOTAL HIP ARTHROPLASTY, DIRECT ANTERIOR APPROACH 11/30/2021. Denies any palpitations, SOB, N/V, Covid symptoms (fever, chills, cough) or exposure.     ***Covid test scheduled for 11/27/2021 at [facility].     89 YO F PMH HTN, HLD, hypothyroid, c/o worsening right hip pain, presents to PST for RIGHT TOTAL HIP ARTHROPLASTY, DIRECT ANTERIOR APPROACH 11/30/2021. Denies any palpitations, SOB, N/V, Covid symptoms (fever, chills, cough) or exposure.     ***Covid test scheduled for 11/27/2021 at [facility].    11/29/21 Spoke to PMD Dr. Hare regarding patients elevated HgA1c 8.2, she stated patient had previously been on Metformin but was having dangerous low episodes of hypoglycemia. Metformin on hold for now and patient ok to proceed with surgery.  DIEGO Alvarado FNP PST

## 2021-11-19 LAB
A1C WITH ESTIMATED AVERAGE GLUCOSE RESULT: 8.2 % — HIGH (ref 4–5.6)
ESTIMATED AVERAGE GLUCOSE: 189 MG/DL — HIGH (ref 68–114)
MRSA PCR RESULT.: SIGNIFICANT CHANGE UP
S AUREUS DNA NOSE QL NAA+PROBE: SIGNIFICANT CHANGE UP

## 2021-11-27 ENCOUNTER — OUTPATIENT (OUTPATIENT)
Dept: OUTPATIENT SERVICES | Facility: HOSPITAL | Age: 86
LOS: 1 days | End: 2021-11-27

## 2021-11-27 DIAGNOSIS — Z98.890 OTHER SPECIFIED POSTPROCEDURAL STATES: Chronic | ICD-10-CM

## 2021-11-27 DIAGNOSIS — E89.0 POSTPROCEDURAL HYPOTHYROIDISM: Chronic | ICD-10-CM

## 2021-11-27 DIAGNOSIS — Z11.52 ENCOUNTER FOR SCREENING FOR COVID-19: ICD-10-CM

## 2021-11-27 LAB — SARS-COV-2 RNA SPEC QL NAA+PROBE: SIGNIFICANT CHANGE UP

## 2021-11-29 ENCOUNTER — TRANSCRIPTION ENCOUNTER (OUTPATIENT)
Age: 86
End: 2021-11-29

## 2021-11-30 ENCOUNTER — APPOINTMENT (OUTPATIENT)
Dept: ORTHOPEDIC SURGERY | Facility: HOSPITAL | Age: 86
End: 2021-11-30

## 2021-11-30 ENCOUNTER — INPATIENT (INPATIENT)
Facility: HOSPITAL | Age: 86
LOS: 2 days | Discharge: NURSING, REHAB OR LONG-TERM CARE | DRG: 470 | End: 2021-12-03
Attending: ORTHOPAEDIC SURGERY | Admitting: ORTHOPAEDIC SURGERY
Payer: MEDICARE

## 2021-11-30 VITALS
TEMPERATURE: 98 F | DIASTOLIC BLOOD PRESSURE: 86 MMHG | SYSTOLIC BLOOD PRESSURE: 129 MMHG | HEIGHT: 63 IN | OXYGEN SATURATION: 98 % | HEART RATE: 84 BPM | WEIGHT: 156.09 LBS | RESPIRATION RATE: 16 BRPM

## 2021-11-30 DIAGNOSIS — E89.0 POSTPROCEDURAL HYPOTHYROIDISM: Chronic | ICD-10-CM

## 2021-11-30 DIAGNOSIS — Z98.890 OTHER SPECIFIED POSTPROCEDURAL STATES: Chronic | ICD-10-CM

## 2021-11-30 DIAGNOSIS — M16.11 UNILATERAL PRIMARY OSTEOARTHRITIS, RIGHT HIP: ICD-10-CM

## 2021-11-30 LAB
GLUCOSE BLDC GLUCOMTR-MCNC: 149 MG/DL — HIGH (ref 70–99)
GLUCOSE BLDC GLUCOMTR-MCNC: 168 MG/DL — HIGH (ref 70–99)
HCT VFR BLD CALC: 39.9 % — SIGNIFICANT CHANGE UP (ref 34.5–45)
HGB BLD-MCNC: 12.9 G/DL — SIGNIFICANT CHANGE UP (ref 11.5–15.5)
MCHC RBC-ENTMCNC: 29.8 PG — SIGNIFICANT CHANGE UP (ref 27–34)
MCHC RBC-ENTMCNC: 32.3 GM/DL — SIGNIFICANT CHANGE UP (ref 32–36)
MCV RBC AUTO: 92.1 FL — SIGNIFICANT CHANGE UP (ref 80–100)
NRBC # BLD: 0 /100 WBCS — SIGNIFICANT CHANGE UP (ref 0–0)
PLATELET # BLD AUTO: 208 K/UL — SIGNIFICANT CHANGE UP (ref 150–400)
RBC # BLD: 4.33 M/UL — SIGNIFICANT CHANGE UP (ref 3.8–5.2)
RBC # FLD: 12.5 % — SIGNIFICANT CHANGE UP (ref 10.3–14.5)
WBC # BLD: 14.66 K/UL — HIGH (ref 3.8–10.5)
WBC # FLD AUTO: 14.66 K/UL — HIGH (ref 3.8–10.5)

## 2021-11-30 PROCEDURE — 27130 TOTAL HIP ARTHROPLASTY: CPT | Mod: RT

## 2021-11-30 PROCEDURE — 72170 X-RAY EXAM OF PELVIS: CPT | Mod: 26

## 2021-11-30 RX ORDER — KETOROLAC TROMETHAMINE 30 MG/ML
15 SYRINGE (ML) INJECTION EVERY 6 HOURS
Refills: 0 | Status: DISCONTINUED | OUTPATIENT
Start: 2021-11-30 | End: 2021-12-01

## 2021-11-30 RX ORDER — OMEGA-3 ACID ETHYL ESTERS 1 G
1 CAPSULE ORAL
Qty: 0 | Refills: 0 | DISCHARGE

## 2021-11-30 RX ORDER — ACETAMINOPHEN 500 MG
975 TABLET ORAL EVERY 8 HOURS
Refills: 0 | Status: DISCONTINUED | OUTPATIENT
Start: 2021-11-30 | End: 2021-12-03

## 2021-11-30 RX ORDER — CALCIUM CARBONATE 500(1250)
1 TABLET ORAL DAILY
Refills: 0 | Status: DISCONTINUED | OUTPATIENT
Start: 2021-11-30 | End: 2021-12-03

## 2021-11-30 RX ORDER — POTASSIUM CHLORIDE 20 MEQ
1 PACKET (EA) ORAL
Qty: 0 | Refills: 0 | DISCHARGE

## 2021-11-30 RX ORDER — SODIUM CHLORIDE 9 MG/ML
1000 INJECTION INTRAMUSCULAR; INTRAVENOUS; SUBCUTANEOUS
Refills: 0 | Status: DISCONTINUED | OUTPATIENT
Start: 2021-11-30 | End: 2021-12-03

## 2021-11-30 RX ORDER — PANTOPRAZOLE SODIUM 20 MG/1
40 TABLET, DELAYED RELEASE ORAL ONCE
Refills: 0 | Status: COMPLETED | OUTPATIENT
Start: 2021-11-30 | End: 2021-11-30

## 2021-11-30 RX ORDER — ONDANSETRON 8 MG/1
4 TABLET, FILM COATED ORAL ONCE
Refills: 0 | Status: DISCONTINUED | OUTPATIENT
Start: 2021-11-30 | End: 2021-11-30

## 2021-11-30 RX ORDER — OXYCODONE HYDROCHLORIDE 5 MG/1
5 TABLET ORAL ONCE
Refills: 0 | Status: DISCONTINUED | OUTPATIENT
Start: 2021-11-30 | End: 2021-11-30

## 2021-11-30 RX ORDER — TRAMADOL HYDROCHLORIDE 50 MG/1
25 TABLET ORAL EVERY 6 HOURS
Refills: 0 | Status: DISCONTINUED | OUTPATIENT
Start: 2021-11-30 | End: 2021-12-03

## 2021-11-30 RX ORDER — SIMVASTATIN 20 MG/1
1 TABLET, FILM COATED ORAL
Qty: 0 | Refills: 0 | DISCHARGE

## 2021-11-30 RX ORDER — SODIUM CHLORIDE 9 MG/ML
500 INJECTION, SOLUTION INTRAVENOUS ONCE
Refills: 0 | Status: COMPLETED | OUTPATIENT
Start: 2021-11-30 | End: 2021-11-30

## 2021-11-30 RX ORDER — SODIUM CHLORIDE 9 MG/ML
500 INJECTION, SOLUTION INTRAVENOUS ONCE
Refills: 0 | Status: COMPLETED | OUTPATIENT
Start: 2021-11-30 | End: 2021-12-01

## 2021-11-30 RX ORDER — PANTOPRAZOLE SODIUM 20 MG/1
40 TABLET, DELAYED RELEASE ORAL
Refills: 0 | Status: DISCONTINUED | OUTPATIENT
Start: 2021-11-30 | End: 2021-12-03

## 2021-11-30 RX ORDER — PHENYLEPHRINE HYDROCHLORIDE 10 MG/ML
0.3 INJECTION INTRAVENOUS
Qty: 40 | Refills: 0 | Status: DISCONTINUED | OUTPATIENT
Start: 2021-11-30 | End: 2021-12-01

## 2021-11-30 RX ORDER — SIMVASTATIN 20 MG/1
5 TABLET, FILM COATED ORAL AT BEDTIME
Refills: 0 | Status: DISCONTINUED | OUTPATIENT
Start: 2021-11-30 | End: 2021-12-03

## 2021-11-30 RX ORDER — CELECOXIB 200 MG/1
200 CAPSULE ORAL EVERY 12 HOURS
Refills: 0 | Status: DISCONTINUED | OUTPATIENT
Start: 2021-12-01 | End: 2021-12-03

## 2021-11-30 RX ORDER — ASCORBIC ACID 60 MG
500 TABLET,CHEWABLE ORAL
Refills: 0 | Status: DISCONTINUED | OUTPATIENT
Start: 2021-11-30 | End: 2021-12-03

## 2021-11-30 RX ORDER — OXYCODONE HYDROCHLORIDE 5 MG/1
5 TABLET ORAL EVERY 4 HOURS
Refills: 0 | Status: DISCONTINUED | OUTPATIENT
Start: 2021-11-30 | End: 2021-12-03

## 2021-11-30 RX ORDER — FOLIC ACID 0.8 MG
1 TABLET ORAL DAILY
Refills: 0 | Status: DISCONTINUED | OUTPATIENT
Start: 2021-11-30 | End: 2021-12-03

## 2021-11-30 RX ORDER — SENNA PLUS 8.6 MG/1
2 TABLET ORAL AT BEDTIME
Refills: 0 | Status: DISCONTINUED | OUTPATIENT
Start: 2021-11-30 | End: 2021-12-03

## 2021-11-30 RX ORDER — LEVOTHYROXINE SODIUM 125 MCG
75 TABLET ORAL DAILY
Refills: 0 | Status: DISCONTINUED | OUTPATIENT
Start: 2021-11-30 | End: 2021-12-03

## 2021-11-30 RX ORDER — LEVOTHYROXINE SODIUM 125 MCG
1 TABLET ORAL
Qty: 0 | Refills: 0 | DISCHARGE

## 2021-11-30 RX ORDER — CALCIUM CARBONATE 500(1250)
2 TABLET ORAL
Qty: 0 | Refills: 0 | DISCHARGE

## 2021-11-30 RX ORDER — ASPIRIN/CALCIUM CARB/MAGNESIUM 324 MG
325 TABLET ORAL
Refills: 0 | Status: DISCONTINUED | OUTPATIENT
Start: 2021-11-30 | End: 2021-12-03

## 2021-11-30 RX ORDER — ATENOLOL 25 MG/1
25 TABLET ORAL DAILY
Refills: 0 | Status: DISCONTINUED | OUTPATIENT
Start: 2021-11-30 | End: 2021-12-03

## 2021-11-30 RX ORDER — OMEPRAZOLE 10 MG/1
1 CAPSULE, DELAYED RELEASE ORAL
Qty: 0 | Refills: 0 | DISCHARGE

## 2021-11-30 RX ORDER — ACETAMINOPHEN 500 MG
1000 TABLET ORAL ONCE
Refills: 0 | Status: DISCONTINUED | OUTPATIENT
Start: 2021-11-30 | End: 2021-12-03

## 2021-11-30 RX ORDER — HYDROCHLOROTHIAZIDE 25 MG
25 TABLET ORAL DAILY
Refills: 0 | Status: DISCONTINUED | OUTPATIENT
Start: 2021-11-30 | End: 2021-12-03

## 2021-11-30 RX ORDER — TRAMADOL HYDROCHLORIDE 50 MG/1
50 TABLET ORAL ONCE
Refills: 0 | Status: COMPLETED | OUTPATIENT
Start: 2021-11-30 | End: 2021-11-30

## 2021-11-30 RX ORDER — OXYCODONE HYDROCHLORIDE 5 MG/1
2.5 TABLET ORAL EVERY 4 HOURS
Refills: 0 | Status: DISCONTINUED | OUTPATIENT
Start: 2021-11-30 | End: 2021-12-03

## 2021-11-30 RX ORDER — ONDANSETRON 8 MG/1
4 TABLET, FILM COATED ORAL EVERY 6 HOURS
Refills: 0 | Status: DISCONTINUED | OUTPATIENT
Start: 2021-11-30 | End: 2021-12-03

## 2021-11-30 RX ORDER — CEFAZOLIN SODIUM 1 G
2000 VIAL (EA) INJECTION EVERY 8 HOURS
Refills: 0 | Status: COMPLETED | OUTPATIENT
Start: 2021-11-30 | End: 2021-12-01

## 2021-11-30 RX ORDER — FENTANYL CITRATE 50 UG/ML
25 INJECTION INTRAVENOUS
Refills: 0 | Status: DISCONTINUED | OUTPATIENT
Start: 2021-11-30 | End: 2021-11-30

## 2021-11-30 RX ORDER — ATENOLOL 25 MG/1
1 TABLET ORAL
Qty: 0 | Refills: 0 | DISCHARGE

## 2021-11-30 RX ADMIN — Medication 15 MILLIGRAM(S): at 17:40

## 2021-11-30 RX ADMIN — Medication 325 MILLIGRAM(S): at 17:40

## 2021-11-30 RX ADMIN — SODIUM CHLORIDE 75 MILLILITER(S): 9 INJECTION INTRAMUSCULAR; INTRAVENOUS; SUBCUTANEOUS at 16:27

## 2021-11-30 RX ADMIN — SENNA PLUS 2 TABLET(S): 8.6 TABLET ORAL at 22:19

## 2021-11-30 RX ADMIN — SODIUM CHLORIDE 500 MILLILITER(S): 9 INJECTION, SOLUTION INTRAVENOUS at 16:27

## 2021-11-30 RX ADMIN — Medication 150 MILLIGRAM(S): at 13:01

## 2021-11-30 RX ADMIN — Medication 100 MILLIGRAM(S): at 22:03

## 2021-11-30 RX ADMIN — PHENYLEPHRINE HYDROCHLORIDE 7.97 MICROGRAM(S)/KG/MIN: 10 INJECTION INTRAVENOUS at 17:40

## 2021-11-30 RX ADMIN — Medication 15 MILLIGRAM(S): at 18:00

## 2021-11-30 RX ADMIN — Medication 975 MILLIGRAM(S): at 22:36

## 2021-11-30 RX ADMIN — PANTOPRAZOLE SODIUM 40 MILLIGRAM(S): 20 TABLET, DELAYED RELEASE ORAL at 13:00

## 2021-11-30 RX ADMIN — Medication 975 MILLIGRAM(S): at 22:06

## 2021-11-30 RX ADMIN — Medication 500 MILLIGRAM(S): at 22:07

## 2021-11-30 RX ADMIN — SIMVASTATIN 5 MILLIGRAM(S): 20 TABLET, FILM COATED ORAL at 22:14

## 2021-11-30 NOTE — PHYSICAL THERAPY INITIAL EVALUATION ADULT - PERTINENT HX OF CURRENT PROBLEM, REHAB EVAL
89 y/o F with h/o HTN, HLD, hypothyroid with c/o worsening right hip pain. pt now presents s/p R WOLF by anterior approach.

## 2021-11-30 NOTE — CHART NOTE - NSCHARTNOTEFT_GEN_A_CORE
Resting without complaints.  No Chest Pain, SOB, N/V.    T(C): 36 (11-30-21 @ 16:00), Max: 36.8 (11-30-21 @ 10:15)  HR: 70 (11-30-21 @ 17:45) (68 - 90)  BP: 116/61 (11-30-21 @ 17:45) (99/54 - 129/86)  RR: 18 (11-30-21 @ 17:45) (12 - 20)  SpO2: 99% (11-30-21 @ 17:45) (98% - 100%)      Exam:  Alert and [place], No Acute Distress  Card: +S1/S2, RRR  Pulm: CTAB  Laterality: R Hip  Dressing: Aquacel c/d/i  Calves soft, non-tender bilaterally  +PF/DF/EHL/FHL  SILT  + DP pulse    Xray: IMPRESSION:  Cementless right total hip prosthesis implanted.    Intact and aligned hardware and no periprosthetic fractures.    Postoperative soft tissue changes.    Correlate with intraoperative findings.    Generalized osteopenia, pubic symphysis degenerative change, and popcorn type pelvic calcification (probably uterine fibroid related) also noted.    --- End of Report ---      EKG: Normal Sinus rhythm with occasional sporadic Sinus pauses < 2 sec.  Patient blood pressure stable, patient aware and reports her primary care aware and reports was told not of concern.            A/P: 89y Female S/p R Total Hip Arthroplasty. Patient mildly hypotensive in PACU, NAD  -Hypotension: Managed by PACU team, on maintenance fluids, bolus fluids and phenylephrine infusion.  -PT/OT-WBAT RLE, Anterior Hip Dislocation precautions.  -F/U AM Labs  -IS  -DVT PPx: ASA 325mg PO BID and SCDs  -Pain Control  -Continue Current Tx  -Dispo planning PACU to Floor once stable and meets PACU protocol.    Lincoln Wooten PA-C  Orthopedic Surgery Team  Team Pager #[phone] Resting without complaints.  No Chest Pain, SOB, N/V.    T(C): 36 (11-30-21 @ 16:00), Max: 36.8 (11-30-21 @ 10:15)  HR: 70 (11-30-21 @ 17:45) (68 - 90)  BP: 116/61 (11-30-21 @ 17:45) (99/54 - 129/86)  RR: 18 (11-30-21 @ 17:45) (12 - 20)  SpO2: 99% (11-30-21 @ 17:45) (98% - 100%)      Exam:  Alert and [place], No Acute Distress  Card: +S1/S2, RRR  Pulm: CTAB  Laterality: R Hip  Dressing: Aquacel c/d/i  Calves soft, non-tender bilaterally  +PF/DF/EHL/FHL  SILT  + DP pulse    Xray: IMPRESSION:  Cementless right total hip prosthesis implanted.    Intact and aligned hardware and no periprosthetic fractures.    Postoperative soft tissue changes.    Correlate with intraoperative findings.    Generalized osteopenia, pubic symphysis degenerative change, and popcorn type pelvic calcification (probably uterine fibroid related) also noted.    --- End of Report ---      EKG: Normal Sinus rhythm with occasional sporadic Sinus pauses < 2 sec.  Patient blood pressure stable, patient aware and reports her primary care aware and reports was told not of concern. Discussed with PACU provider and occasional arrhythmia does not appear to be of concern.           A/P: 89y Female S/p R Total Hip Arthroplasty. Patient mildly hypotensive in PACU, NAD  -Hypotension: Managed by PACU team, on maintenance fluids, bolus fluids and phenylephrine infusion.  -PT/OT-WBAT RLE, Anterior Hip Dislocation precautions.  -F/U AM Labs  -IS  -DVT PPx: ASA 325mg PO BID and SCDs  -Pain Control  -Continue Current Tx  -Dispo planning PACU to Floor once stable and meets PACU protocol.    Lincoln Wooten PA-C  Orthopedic Surgery Team  Team Pager #[phone]

## 2021-11-30 NOTE — PATIENT PROFILE ADULT - FALL HARM RISK - HARM RISK INTERVENTIONS

## 2021-11-30 NOTE — PATIENT PROFILE ADULT - NSPROGENSOURCEINFO_GEN_A_NUR
patient Epidermal Autograft Text: The defect edges were debeveled with a #15 scalpel blade.  Given the location of the defect, shape of the defect and the proximity to free margins an epidermal autograft was deemed most appropriate.  Using a sterile surgical marker, the primary defect shape was transferred to the donor site. The epidermal graft was then harvested.  The skin graft was then placed in the primary defect and oriented appropriately.

## 2021-11-30 NOTE — PHYSICAL THERAPY INITIAL EVALUATION ADULT - ADDITIONAL COMMENTS
Pt lives in private home alone. Prior to admission, pt was I with all functional mobility and ADLs with rolling walker. Pt lives in private home alone, 5-6 steps to enter +additional 6 steps inside.. Prior to admission, pt was I with all functional mobility and ADLs with rolling walker.

## 2021-11-30 NOTE — PHYSICAL THERAPY INITIAL EVALUATION ADULT - GAIT DEVIATIONS NOTED, PT EVAL
decreased thom/increased time in double stance/decreased velocity of limb motion/decreased stride length/decreased weight-shifting ability

## 2021-12-01 ENCOUNTER — TRANSCRIPTION ENCOUNTER (OUTPATIENT)
Age: 86
End: 2021-12-01

## 2021-12-01 LAB
ANION GAP SERPL CALC-SCNC: 17 MMOL/L — SIGNIFICANT CHANGE UP (ref 5–17)
BUN SERPL-MCNC: 18 MG/DL — SIGNIFICANT CHANGE UP (ref 7–23)
CALCIUM SERPL-MCNC: 8.7 MG/DL — SIGNIFICANT CHANGE UP (ref 8.4–10.5)
CHLORIDE SERPL-SCNC: 97 MMOL/L — SIGNIFICANT CHANGE UP (ref 96–108)
CO2 SERPL-SCNC: 22 MMOL/L — SIGNIFICANT CHANGE UP (ref 22–31)
CREAT SERPL-MCNC: 0.71 MG/DL — SIGNIFICANT CHANGE UP (ref 0.5–1.3)
GLUCOSE SERPL-MCNC: 241 MG/DL — HIGH (ref 70–99)
HCT VFR BLD CALC: 39.1 % — SIGNIFICANT CHANGE UP (ref 34.5–45)
HGB BLD-MCNC: 12.9 G/DL — SIGNIFICANT CHANGE UP (ref 11.5–15.5)
MCHC RBC-ENTMCNC: 29.8 PG — SIGNIFICANT CHANGE UP (ref 27–34)
MCHC RBC-ENTMCNC: 33 GM/DL — SIGNIFICANT CHANGE UP (ref 32–36)
MCV RBC AUTO: 90.3 FL — SIGNIFICANT CHANGE UP (ref 80–100)
NRBC # BLD: 0 /100 WBCS — SIGNIFICANT CHANGE UP (ref 0–0)
PLATELET # BLD AUTO: 223 K/UL — SIGNIFICANT CHANGE UP (ref 150–400)
POTASSIUM SERPL-MCNC: 3.5 MMOL/L — SIGNIFICANT CHANGE UP (ref 3.5–5.3)
POTASSIUM SERPL-SCNC: 3.5 MMOL/L — SIGNIFICANT CHANGE UP (ref 3.5–5.3)
RBC # BLD: 4.33 M/UL — SIGNIFICANT CHANGE UP (ref 3.8–5.2)
RBC # FLD: 12.6 % — SIGNIFICANT CHANGE UP (ref 10.3–14.5)
SARS-COV-2 RNA SPEC QL NAA+PROBE: SIGNIFICANT CHANGE UP
SODIUM SERPL-SCNC: 136 MMOL/L — SIGNIFICANT CHANGE UP (ref 135–145)
WBC # BLD: 11.99 K/UL — HIGH (ref 3.8–10.5)
WBC # FLD AUTO: 11.99 K/UL — HIGH (ref 3.8–10.5)

## 2021-12-01 RX ORDER — CELECOXIB 200 MG/1
1 CAPSULE ORAL
Qty: 0 | Refills: 0 | DISCHARGE
Start: 2021-12-01

## 2021-12-01 RX ORDER — ACETAMINOPHEN 500 MG
2 TABLET ORAL
Qty: 0 | Refills: 0 | DISCHARGE

## 2021-12-01 RX ORDER — SENNA PLUS 8.6 MG/1
2 TABLET ORAL
Qty: 0 | Refills: 0 | DISCHARGE
Start: 2021-12-01

## 2021-12-01 RX ORDER — ASPIRIN/CALCIUM CARB/MAGNESIUM 324 MG
1 TABLET ORAL
Qty: 0 | Refills: 0 | DISCHARGE
Start: 2021-12-01

## 2021-12-01 RX ORDER — TRAMADOL HYDROCHLORIDE 50 MG/1
0.5 TABLET ORAL
Qty: 0 | Refills: 0 | DISCHARGE
Start: 2021-12-01

## 2021-12-01 RX ORDER — OXYCODONE HYDROCHLORIDE 5 MG/1
1 TABLET ORAL
Qty: 0 | Refills: 0 | DISCHARGE
Start: 2021-12-01

## 2021-12-01 RX ORDER — ASPIRIN/CALCIUM CARB/MAGNESIUM 324 MG
1 TABLET ORAL
Qty: 0 | Refills: 0 | DISCHARGE

## 2021-12-01 RX ORDER — ACETAMINOPHEN 500 MG
3 TABLET ORAL
Qty: 0 | Refills: 0 | DISCHARGE
Start: 2021-12-01

## 2021-12-01 RX ADMIN — SENNA PLUS 2 TABLET(S): 8.6 TABLET ORAL at 22:00

## 2021-12-01 RX ADMIN — Medication 15 MILLIGRAM(S): at 00:37

## 2021-12-01 RX ADMIN — CELECOXIB 200 MILLIGRAM(S): 200 CAPSULE ORAL at 17:38

## 2021-12-01 RX ADMIN — SIMVASTATIN 5 MILLIGRAM(S): 20 TABLET, FILM COATED ORAL at 22:00

## 2021-12-01 RX ADMIN — Medication 975 MILLIGRAM(S): at 06:54

## 2021-12-01 RX ADMIN — Medication 25 MILLIGRAM(S): at 06:24

## 2021-12-01 RX ADMIN — Medication 500 MILLIGRAM(S): at 17:08

## 2021-12-01 RX ADMIN — SODIUM CHLORIDE 500 MILLILITER(S): 9 INJECTION, SOLUTION INTRAVENOUS at 00:08

## 2021-12-01 RX ADMIN — Medication 15 MILLIGRAM(S): at 12:37

## 2021-12-01 RX ADMIN — Medication 1 MILLIGRAM(S): at 12:07

## 2021-12-01 RX ADMIN — Medication 1 TABLET(S): at 12:09

## 2021-12-01 RX ADMIN — Medication 75 MICROGRAM(S): at 06:24

## 2021-12-01 RX ADMIN — PANTOPRAZOLE SODIUM 40 MILLIGRAM(S): 20 TABLET, DELAYED RELEASE ORAL at 06:23

## 2021-12-01 RX ADMIN — Medication 100 MILLIGRAM(S): at 06:24

## 2021-12-01 RX ADMIN — Medication 15 MILLIGRAM(S): at 06:23

## 2021-12-01 RX ADMIN — ATENOLOL 25 MILLIGRAM(S): 25 TABLET ORAL at 06:24

## 2021-12-01 RX ADMIN — Medication 500 MILLIGRAM(S): at 06:24

## 2021-12-01 RX ADMIN — Medication 975 MILLIGRAM(S): at 06:24

## 2021-12-01 RX ADMIN — Medication 975 MILLIGRAM(S): at 22:00

## 2021-12-01 RX ADMIN — SODIUM CHLORIDE 500 MILLILITER(S): 9 INJECTION, SOLUTION INTRAVENOUS at 06:23

## 2021-12-01 RX ADMIN — SODIUM CHLORIDE 75 MILLILITER(S): 9 INJECTION INTRAMUSCULAR; INTRAVENOUS; SUBCUTANEOUS at 00:08

## 2021-12-01 RX ADMIN — Medication 325 MILLIGRAM(S): at 06:23

## 2021-12-01 RX ADMIN — Medication 15 MILLIGRAM(S): at 12:07

## 2021-12-01 RX ADMIN — Medication 325 MILLIGRAM(S): at 17:08

## 2021-12-01 RX ADMIN — Medication 975 MILLIGRAM(S): at 22:30

## 2021-12-01 RX ADMIN — CELECOXIB 200 MILLIGRAM(S): 200 CAPSULE ORAL at 17:08

## 2021-12-01 RX ADMIN — Medication 1 TABLET(S): at 12:05

## 2021-12-01 RX ADMIN — Medication 15 MILLIGRAM(S): at 06:54

## 2021-12-01 RX ADMIN — Medication 15 MILLIGRAM(S): at 00:07

## 2021-12-01 NOTE — DISCHARGE NOTE PROVIDER - HOSPITAL COURSE
Reason for Admission	"I'm having my right hip replaced"  GOC: To alleviate my pain & help me walk better     History of Present Illness:  History of Present Illness	  89 YO F PMH HTN, HLD, hypothyroid, c/o worsening right hip pain, presents to PST for RIGHT TOTAL HIP ARTHROPLASTY, DIRECT ANTERIOR APPROACH 11/30/2021. Denies any palpitations, SOB, N/V, Covid symptoms (fever, chills, cough) or exposure.     ***Covid test scheduled for 11/27/2021 at [facility].    11/29/21 Spoke to PMD Dr. Hare regarding patients elevated HgA1c 8.2, she stated patient had previously been on Metformin but was having dangerous low episodes of hypoglycemia. Metformin on hold for now and patient ok to proceed with surgery.  DIEGO Alvarado P PST         Allergies/Medications:   Allergies:        Allergies:  	No Known Allergies:     PMH/PSH/FH/SH:    Past Medical, Past Surgical, and Family History:  PAST MEDICAL HISTORY:  GERD (gastroesophageal reflux disease)   History of prediabetes   HLD (hyperlipidemia)   HTN (hypertension)   Hypothyroid   Monoclonal gammopathy "diagnosed years ago on a blood test" - asymptomatic per pt  OA (osteoarthritis).     PAST SURGICAL HISTORY:  H/O dilation and curettage   H/O thyroidectomy partial.    This is a admitted to [facility] on 11/30/21.  Surgery was uncomplicated.  Patient evaluated and treated by PT, recommended for home.  Remain of hospital stay unremarkable, and patient discharged home when PT cleared. Reason for Admission	"I'm having my right hip replaced"  GOC: To alleviate my pain & help me walk better     History of Present Illness:  History of Present Illness	  89 YO F PMH HTN, HLD, hypothyroid, c/o worsening right hip pain, presents to PST for RIGHT TOTAL HIP ARTHROPLASTY, DIRECT ANTERIOR APPROACH 11/30/2021. Denies any palpitations, SOB, N/V, Covid symptoms (fever, chills, cough) or exposure.     ***Covid test scheduled for 11/27/2021 at [facility].    11/29/21 Spoke to PMD Dr. Hare regarding patients elevated HgA1c 8.2, she stated patient had previously been on Metformin but was having dangerous low episodes of hypoglycemia. Metformin on hold for now and patient ok to proceed with surgery.  DIEGO Alvarado P PST         Allergies/Medications:   Allergies:        Allergies:  	No Known Allergies:     PMH/PSH/FH/SH:    Past Medical, Past Surgical, and Family History:  PAST MEDICAL HISTORY:  GERD (gastroesophageal reflux disease)   History of prediabetes   HLD (hyperlipidemia)   HTN (hypertension)   Hypothyroid   Monoclonal gammopathy "diagnosed years ago on a blood test" - asymptomatic per pt  OA (osteoarthritis).     PAST SURGICAL HISTORY:  H/O dilation and curettage   H/O thyroidectomy partial.    This is a admitted to [facility] on 11/30/21.  Surgery was uncomplicated.  Patient evaluated and treated by PT, recommended for home.  Remain of hospital stay unremarkable, and patient discharged to rehab when bed available. Reason for Admission	"I'm having my right hip replaced"  GOC: To alleviate my pain & help me walk better     History of Present Illness:  History of Present Illness	  89 YO F PMH HTN, HLD, hypothyroid, c/o worsening right hip pain, presents to PST for RIGHT TOTAL HIP ARTHROPLASTY, DIRECT ANTERIOR APPROACH 11/30/2021. Denies any palpitations, SOB, N/V, Covid symptoms (fever, chills, cough) or exposure.     ***Covid test scheduled for 11/27/2021 at [facility].    11/29/21 Spoke to PMD Dr. Hare regarding patients elevated HgA1c 8.2, she stated patient had previously been on Metformin but was having dangerous low episodes of hypoglycemia. Metformin on hold for now and patient ok to proceed with surgery.  DIEGO Alvarado P PST         Allergies/Medications:   Allergies:        Allergies:  	No Known Allergies:     PMH/PSH/FH/SH:    Past Medical, Past Surgical, and Family History:  PAST MEDICAL HISTORY:  GERD (gastroesophageal reflux disease)   History of prediabetes   HLD (hyperlipidemia)   HTN (hypertension)   Hypothyroid   Monoclonal gammopathy "diagnosed years ago on a blood test" - asymptomatic per pt  OA (osteoarthritis).     PAST SURGICAL HISTORY:  H/O dilation and curettage   H/O thyroidectomy partial.      Hospital Course:  This is a admitted to [facility] on 11/30/21.  Surgery was uncomplicated.  Patient evaluated and treated by PT, recommended for home.  Remain of hospital stay unremarkable, and patient discharged to rehab when bed available.

## 2021-12-01 NOTE — DISCHARGE NOTE PROVIDER - CARE PROVIDERS DIRECT ADDRESSES
[email or web address]

## 2021-12-01 NOTE — DISCHARGE NOTE PROVIDER - CARE PROVIDER_API CALL
Guadalupe Berry)  Orthopaedic Surgery  [address], [address]  [place] [address]  Phone: [phone]  Fax: [phone]  Follow Up Time:

## 2021-12-01 NOTE — OCCUPATIONAL THERAPY INITIAL EVALUATION ADULT - LIVES WITH, PROFILE
Pt lives in private home alone, 5-6 steps to enter +additional 6 steps inside.. Prior to admission, pt was I with all functional mobility and ADLs with rolling walker.

## 2021-12-01 NOTE — PROVIDER CONTACT NOTE (OTHER) - ASSESSMENT
B/L LE cool to touch. B/L pallor in color. +2 pulses noted. denies N/T. pt able to dorsi/plantarflexion. pt. has positive sensation

## 2021-12-01 NOTE — OCCUPATIONAL THERAPY INITIAL EVALUATION ADULT - ADL RETRAINING, OT EVAL
24-Jan-2017 16:00
Pt will complete lower body dressing with independence and AD as needed within 4 weeks.

## 2021-12-01 NOTE — DISCHARGE NOTE PROVIDER - NSDCFUADDINST_GEN_ALL_CORE_FT
Please follow up with your doctor 14 days after your discharge from the hospital (call for appointment).  PT-weight bearing as tolerated.  Aspirin 325 twice daily x 6 weeks total for dvt prevention.  Keep dressing clean and intact, have doctor remove staples/sutures post op day 14 (if applicable) and apply steristrips.  Please follow up with your PMD within 1 month for routine checkup.  Please follow up with your doctor after your discharge from rehab (2 weeks, call for appointment).  PT-weight bearing as tolerated.  Aspirin 325 twice daily x 6 weeks total for dvt prevention.  Keep dressing clean and intact, have doctor remove staples/sutures post op day 14 (if applicable) and apply steristrips.  Please follow up with your PMD within 1 month for routine checkup.

## 2021-12-01 NOTE — DISCHARGE NOTE PROVIDER - NSDCMRMEDTOKEN_GEN_ALL_CORE_FT
acetaminophen 500 mg oral tablet: 2 tab(s) orally every 6 hours, As Needed  aspirin 81 mg oral delayed release tablet: 1 tab(s) orally once a day  atenolol 25 mg oral tablet: 1 tab(s) orally once a day  Caltrate 600 mg oral tablet: 2 tab(s) orally once a day  hydroCHLOROthiazide 25 mg oral tablet: 1 tab(s) orally once a day  Klor-Con 10 mEq oral tablet, extended release: 1 tab(s) orally once a day  levothyroxine 75 mcg (0.075 mg) oral tablet: 1 tab(s) orally once a day  Multiple Vitamins oral tablet: 1 tab(s) orally once a day  Omega-3 1000 mg oral capsule: 1 cap(s) orally once a day  omeprazole 20 mg oral delayed release capsule: 1 cap(s) orally once a day  simvastatin 5 mg oral tablet: 1 tab(s) orally once a day (at bedtime)   acetaminophen 325 mg oral tablet: Take 3 tabs oral every 8 hours x 5 days, then as needed for mild pain, temp &gt;100.4F   aspirin 325 mg oral delayed release tablet: 1 tab(s) orally 2 times a day x 6 weeks total for dvt prevention  atenolol 25 mg oral tablet: 1 tab(s) orally once a day  Caltrate 600 mg oral tablet: 2 tab(s) orally once a day  celecoxib 200 mg oral capsule: 1 cap(s) orally every 12 hours  hydroCHLOROthiazide 25 mg oral tablet: 1 tab(s) orally once a day  Klor-Con 10 mEq oral tablet, extended release: 1 tab(s) orally once a day  levothyroxine 75 mcg (0.075 mg) oral tablet: 1 tab(s) orally once a day  Multiple Vitamins oral tablet: 1 tab(s) orally once a day  Omega-3 1000 mg oral capsule: 1 cap(s) orally once a day  omeprazole 20 mg oral delayed release capsule: 1 cap(s) orally once a day  oxyCODONE 5 mg oral tablet: Take 0.5 tab(s) orally every 4-6 hours, As Needed, for moderate pain or 1 tabs every 4-6 hrs as needed for severe pain   senna oral tablet: 2 tab(s) orally once a day (at bedtime)  simvastatin 5 mg oral tablet: 1 tab(s) orally once a day (at bedtime)  traMADol 50 mg oral tablet: 0.5 tab(s) orally every 6 hours, As needed, Mild Pain (1 - 3)

## 2021-12-02 PROBLEM — M19.90 UNSPECIFIED OSTEOARTHRITIS, UNSPECIFIED SITE: Chronic | Status: ACTIVE | Noted: 2021-11-18

## 2021-12-02 PROBLEM — K21.9 GASTRO-ESOPHAGEAL REFLUX DISEASE WITHOUT ESOPHAGITIS: Chronic | Status: ACTIVE | Noted: 2021-11-18

## 2021-12-02 PROBLEM — E78.5 HYPERLIPIDEMIA, UNSPECIFIED: Chronic | Status: ACTIVE | Noted: 2021-11-18

## 2021-12-02 PROBLEM — Z87.898 PERSONAL HISTORY OF OTHER SPECIFIED CONDITIONS: Chronic | Status: ACTIVE | Noted: 2021-11-18

## 2021-12-02 PROBLEM — I10 ESSENTIAL (PRIMARY) HYPERTENSION: Chronic | Status: ACTIVE | Noted: 2021-11-18

## 2021-12-02 PROBLEM — E03.9 HYPOTHYROIDISM, UNSPECIFIED: Chronic | Status: ACTIVE | Noted: 2021-11-18

## 2021-12-02 PROBLEM — D47.2 MONOCLONAL GAMMOPATHY: Chronic | Status: ACTIVE | Noted: 2021-11-18

## 2021-12-02 RX ADMIN — Medication 975 MILLIGRAM(S): at 05:52

## 2021-12-02 RX ADMIN — Medication 975 MILLIGRAM(S): at 21:46

## 2021-12-02 RX ADMIN — Medication 75 MICROGRAM(S): at 05:52

## 2021-12-02 RX ADMIN — Medication 975 MILLIGRAM(S): at 06:20

## 2021-12-02 RX ADMIN — Medication 500 MILLIGRAM(S): at 17:23

## 2021-12-02 RX ADMIN — CELECOXIB 200 MILLIGRAM(S): 200 CAPSULE ORAL at 06:20

## 2021-12-02 RX ADMIN — TRAMADOL HYDROCHLORIDE 25 MILLIGRAM(S): 50 TABLET ORAL at 12:14

## 2021-12-02 RX ADMIN — Medication 325 MILLIGRAM(S): at 17:22

## 2021-12-02 RX ADMIN — Medication 1 MILLIGRAM(S): at 11:24

## 2021-12-02 RX ADMIN — CELECOXIB 200 MILLIGRAM(S): 200 CAPSULE ORAL at 05:52

## 2021-12-02 RX ADMIN — PANTOPRAZOLE SODIUM 40 MILLIGRAM(S): 20 TABLET, DELAYED RELEASE ORAL at 05:52

## 2021-12-02 RX ADMIN — CELECOXIB 200 MILLIGRAM(S): 200 CAPSULE ORAL at 17:23

## 2021-12-02 RX ADMIN — Medication 1 TABLET(S): at 11:34

## 2021-12-02 RX ADMIN — Medication 500 MILLIGRAM(S): at 05:52

## 2021-12-02 RX ADMIN — Medication 975 MILLIGRAM(S): at 21:16

## 2021-12-02 RX ADMIN — SENNA PLUS 2 TABLET(S): 8.6 TABLET ORAL at 21:17

## 2021-12-02 RX ADMIN — SIMVASTATIN 5 MILLIGRAM(S): 20 TABLET, FILM COATED ORAL at 22:55

## 2021-12-02 RX ADMIN — Medication 1 TABLET(S): at 11:26

## 2021-12-02 RX ADMIN — CELECOXIB 200 MILLIGRAM(S): 200 CAPSULE ORAL at 17:53

## 2021-12-02 RX ADMIN — TRAMADOL HYDROCHLORIDE 25 MILLIGRAM(S): 50 TABLET ORAL at 11:34

## 2021-12-02 RX ADMIN — Medication 325 MILLIGRAM(S): at 05:52

## 2021-12-03 ENCOUNTER — TRANSCRIPTION ENCOUNTER (OUTPATIENT)
Age: 86
End: 2021-12-03

## 2021-12-03 VITALS
SYSTOLIC BLOOD PRESSURE: 103 MMHG | DIASTOLIC BLOOD PRESSURE: 64 MMHG | OXYGEN SATURATION: 97 % | TEMPERATURE: 98 F | HEART RATE: 72 BPM | RESPIRATION RATE: 18 BRPM

## 2021-12-03 LAB
ANION GAP SERPL CALC-SCNC: 12 MMOL/L — SIGNIFICANT CHANGE UP (ref 5–17)
BUN SERPL-MCNC: 26 MG/DL — HIGH (ref 7–23)
CALCIUM SERPL-MCNC: 9 MG/DL — SIGNIFICANT CHANGE UP (ref 8.4–10.5)
CHLORIDE SERPL-SCNC: 101 MMOL/L — SIGNIFICANT CHANGE UP (ref 96–108)
CO2 SERPL-SCNC: 25 MMOL/L — SIGNIFICANT CHANGE UP (ref 22–31)
CREAT SERPL-MCNC: 0.96 MG/DL — SIGNIFICANT CHANGE UP (ref 0.5–1.3)
GLUCOSE SERPL-MCNC: 175 MG/DL — HIGH (ref 70–99)
HCT VFR BLD CALC: 36.9 % — SIGNIFICANT CHANGE UP (ref 34.5–45)
HGB BLD-MCNC: 11.7 G/DL — SIGNIFICANT CHANGE UP (ref 11.5–15.5)
MCHC RBC-ENTMCNC: 29.5 PG — SIGNIFICANT CHANGE UP (ref 27–34)
MCHC RBC-ENTMCNC: 31.7 GM/DL — LOW (ref 32–36)
MCV RBC AUTO: 92.9 FL — SIGNIFICANT CHANGE UP (ref 80–100)
NRBC # BLD: 0 /100 WBCS — SIGNIFICANT CHANGE UP (ref 0–0)
PLATELET # BLD AUTO: 205 K/UL — SIGNIFICANT CHANGE UP (ref 150–400)
POTASSIUM SERPL-MCNC: 4 MMOL/L — SIGNIFICANT CHANGE UP (ref 3.5–5.3)
POTASSIUM SERPL-SCNC: 4 MMOL/L — SIGNIFICANT CHANGE UP (ref 3.5–5.3)
RBC # BLD: 3.97 M/UL — SIGNIFICANT CHANGE UP (ref 3.8–5.2)
RBC # FLD: 12.8 % — SIGNIFICANT CHANGE UP (ref 10.3–14.5)
SODIUM SERPL-SCNC: 138 MMOL/L — SIGNIFICANT CHANGE UP (ref 135–145)
WBC # BLD: 9.29 K/UL — SIGNIFICANT CHANGE UP (ref 3.8–10.5)
WBC # FLD AUTO: 9.29 K/UL — SIGNIFICANT CHANGE UP (ref 3.8–10.5)

## 2021-12-03 RX ADMIN — Medication 500 MILLIGRAM(S): at 05:47

## 2021-12-03 RX ADMIN — Medication 975 MILLIGRAM(S): at 06:11

## 2021-12-03 RX ADMIN — Medication 975 MILLIGRAM(S): at 05:41

## 2021-12-03 RX ADMIN — Medication 325 MILLIGRAM(S): at 05:47

## 2021-12-03 RX ADMIN — CELECOXIB 200 MILLIGRAM(S): 200 CAPSULE ORAL at 06:12

## 2021-12-03 RX ADMIN — ATENOLOL 25 MILLIGRAM(S): 25 TABLET ORAL at 09:10

## 2021-12-03 RX ADMIN — CELECOXIB 200 MILLIGRAM(S): 200 CAPSULE ORAL at 05:42

## 2021-12-03 RX ADMIN — Medication 1 TABLET(S): at 11:32

## 2021-12-03 RX ADMIN — Medication 1 MILLIGRAM(S): at 11:32

## 2021-12-03 RX ADMIN — Medication 75 MICROGRAM(S): at 05:42

## 2021-12-03 RX ADMIN — PANTOPRAZOLE SODIUM 40 MILLIGRAM(S): 20 TABLET, DELAYED RELEASE ORAL at 05:47

## 2021-12-03 NOTE — DISCHARGE NOTE NURSING/CASE MANAGEMENT/SOCIAL WORK - NSDCPEFALRISK_GEN_ALL_CORE
For information on Fall & Injury Prevention, visit: [email or web address] OR  [email or web address] OR  https://www.cdc.gov/steadi/patient.html

## 2021-12-03 NOTE — PROGRESS NOTE ADULT - ASSESSMENT
A/P: 89 y/o F POD#1 s/p R WOLF    DVT ppx- ASA 325mg PO BID  RLE WBAT/Anterior hip precautions  PT/OT  Pain management prn  FU AM labs  D/W attending      DAMARIS Linn  Orthopedic Surgery  [id number]  
A/P: 89 y/o F POD#2 s/p R WOLF      DVT ppx- ASA 325mg PO BID  RLE WBAT/Anterior hip precautions  PT  Discharge planning to Rehab  D/W attending    DAMARIS Linn  Orthopedic Surgery  [phone]  
A/P: 89 y/o F POD#3 s/p R WOLF      DVT ppx- ASA 325mg PO BID  RLE WBAT/Anterior hip precautions  Pain management prn  PT/OT  Discharge planning to rehab  D/w attending      DAMARIS Linn  Orthopedic Surgery  [phone]

## 2021-12-03 NOTE — PROGRESS NOTE ADULT - SUBJECTIVE AND OBJECTIVE BOX
Patient is a 89y old  Female who presents with a chief complaint of Right Total Hip Arthroplasty (01 Dec 2021 07:38)        Patient comfortable  No complaints    T(C): 36.8 (12-03-21 @ 04:35), Max: 37 (12-02-21 @ 14:27)  HR: 80 (12-03-21 @ 05:42) (68 - 96)  BP: 110/66 (12-03-21 @ 05:42) (100/67 - 110/71)  RR: 18 (12-03-21 @ 04:35) (18 - 18)  SpO2: 96% (12-03-21 @ 04:35) (95% - 97%)      PHYSICAL EXAM:  NAD, Alert and oriented X3  Right Hip: Aquacel Dressing C/D/I; dull sensation grossly intact to light touch; (+) DF/PF; (+) Distal Pulses; No Calf tenderness B/L, PAS     LABS:                        12.9   11.99 )-----------( 223      ( 01 Dec 2021 07:09 )             39.1     12-01    136  |  97  |  18  ----------------------------<  241<H>  3.5   |  22  |  0.71    Ca    8.7      01 Dec 2021 07:10              
  Patient comfortable  No complaints    T(C): 36.3 (12-01-21 @ 05:25), Max: 36.8 (11-30-21 @ 10:15)  HR: 77 (12-01-21 @ 05:25) (62 - 90)  BP: 125/66 (12-01-21 @ 05:25) (92/50 - 130/63)  RR: 18 (12-01-21 @ 05:25) (12 - 20)  SpO2: 95% (12-01-21 @ 05:25) (95% - 100%)      PHYSICAL EXAM:  NAD, Alert and oriented X3  Right Hip: Aquacel Dressing C/D/I; dull sensation grossly intact to light touch; (+) DF/PF; (+) Distal Pulses; No Calf tenderness B/L, PAS     LABS:                        12.9   14.66 )-----------( 208      ( 30 Nov 2021 19:33 )             39.9                   
  Patient comfortable  No complaints    T(C): 36.6 (12-02-21 @ 04:30), Max: 36.8 (12-01-21 @ 14:08)  HR: 75 (12-02-21 @ 04:30) (70 - 80)  BP: 108/65 (12-02-21 @ 04:30) (104/64 - 116/67)  RR: 18 (12-02-21 @ 04:30) (18 - 18)  SpO2: 95% (12-02-21 @ 04:30) (95% - 100%)      PHYSICAL EXAM:  NAD, Alert and oriented X3, pleasant  Right Hip: Aquacel Dressing C/D/I; dull sensation grossly intact to light touch; (+) DF/PF; (+) Distal Pulses; No Calf tenderness B/L, PAS     LABS:                        12.9   11.99 )-----------( 223      ( 01 Dec 2021 07:09 )             39.1     12-01    136  |  97  |  18  ----------------------------<  241<H>  3.5   |  22  |  0.71    Ca    8.7      01 Dec 2021 07:10

## 2021-12-14 ENCOUNTER — APPOINTMENT (OUTPATIENT)
Dept: ORTHOPEDIC SURGERY | Facility: CLINIC | Age: 86
End: 2021-12-14
Payer: MEDICARE

## 2021-12-14 DIAGNOSIS — Z96.642 PRESENCE OF LEFT ARTIFICIAL HIP JOINT: ICD-10-CM

## 2021-12-14 PROCEDURE — 73502 X-RAY EXAM HIP UNI 2-3 VIEWS: CPT | Mod: RT

## 2021-12-14 PROCEDURE — 99024 POSTOP FOLLOW-UP VISIT: CPT

## 2021-12-22 PROCEDURE — 97116 GAIT TRAINING THERAPY: CPT

## 2021-12-22 PROCEDURE — U0003: CPT

## 2021-12-22 PROCEDURE — 36415 COLL VENOUS BLD VENIPUNCTURE: CPT

## 2021-12-22 PROCEDURE — 97161 PT EVAL LOW COMPLEX 20 MIN: CPT

## 2021-12-22 PROCEDURE — 97165 OT EVAL LOW COMPLEX 30 MIN: CPT

## 2021-12-22 PROCEDURE — C9803: CPT

## 2021-12-22 PROCEDURE — 80048 BASIC METABOLIC PNL TOTAL CA: CPT

## 2021-12-22 PROCEDURE — 76000 FLUOROSCOPY <1 HR PHYS/QHP: CPT

## 2021-12-22 PROCEDURE — 97530 THERAPEUTIC ACTIVITIES: CPT

## 2021-12-22 PROCEDURE — 85027 COMPLETE CBC AUTOMATED: CPT

## 2021-12-22 PROCEDURE — 72170 X-RAY EXAM OF PELVIS: CPT

## 2021-12-22 PROCEDURE — 97110 THERAPEUTIC EXERCISES: CPT

## 2021-12-22 PROCEDURE — U0005: CPT

## 2021-12-22 PROCEDURE — 82962 GLUCOSE BLOOD TEST: CPT

## 2021-12-22 PROCEDURE — C1776: CPT

## 2022-03-18 ENCOUNTER — APPOINTMENT (OUTPATIENT)
Dept: ORTHOPEDIC SURGERY | Facility: CLINIC | Age: 87
End: 2022-03-18
Payer: MEDICARE

## 2022-03-18 VITALS — WEIGHT: 162 LBS | BODY MASS INDEX: 28.7 KG/M2 | HEIGHT: 63 IN

## 2022-03-18 DIAGNOSIS — M17.11 UNILATERAL PRIMARY OSTEOARTHRITIS, RIGHT KNEE: ICD-10-CM

## 2022-03-18 DIAGNOSIS — M16.11 UNILATERAL PRIMARY OSTEOARTHRITIS, RIGHT HIP: ICD-10-CM

## 2022-03-18 PROCEDURE — 20610 DRAIN/INJ JOINT/BURSA W/O US: CPT | Mod: 50

## 2022-03-18 PROCEDURE — 99214 OFFICE O/P EST MOD 30 MIN: CPT | Mod: 25

## 2022-03-18 PROCEDURE — 73502 X-RAY EXAM HIP UNI 2-3 VIEWS: CPT | Mod: RT

## 2022-03-18 NOTE — DISCUSSION/SUMMARY
[de-identified] : 89 year old female s/p right THR at 4 months. Overall she is doing well. Continue exercises and activities as tolerated.\par Bilateral knee osteoarthritis, bilateral gel injections provided today. I advised using ice. Patient may follow up in 6 months or sooner if their symptoms worsen in the future. \par \par Bilateral Knee Monovisc Gel Injections (LOT: [id number], EXP: 12/31/23)\par The risks and benefits of injection was discussed with the patient. Verbal consent was obtained from the patient. The area was prepped with Betadine. A lateral suprapatellar approach was used. The knee was injected with  3 cc of lidocaine. The patient tolerated the procedure well. Band-Aid was applied. \par \par

## 2022-03-18 NOTE — HISTORY OF PRESENT ILLNESS
[de-identified] : JENNY BAUMANN is a 89 year old female who presents for follow-up evaluation 4 months s/p right total hip replacement on 11/30/21. Overall she is doing well. Patient is interested in bilateral knee gel injections. She reports good relief with prior injections.  [Stable] : stable [1] : a maximum pain level of 1/10 [Bending] : not worsened by bending [Hip Movement] : not worsened by hip movement [Sitting] : not worsened by sitting [Walking] : not worsened by walking

## 2022-03-18 NOTE — ADDENDUM
[FreeTextEntry1] : I, Glynn Middleton, acted solely as a scribe for Dr. Guadalupe Berry MD on this date 03/18/2022  .\par  \par All medical record entries made by the Scribe were at my, Dr. Guadalupe Berry MD , direction and personally dictated by me on 03/18/2022 . I have reviewed the chart and agree that the record accurately reflects my personal performance of the history, physical exam, assessment and plan. I have also personally directed, reviewed, and agreed with the chart.

## 2022-03-18 NOTE — PHYSICAL EXAM
[Normal RLE] : Right Lower Extremity: No scars, rashes, lesions, ulcers, skin intact [Normal LLE] : Left Lower Extremity: No scars, rashes, lesions, ulcers, skin intact [Normal Touch] : sensation intact for touch [Normal] : no peripheral adenopathy appreciated [Poor Appearance] : well-appearing [Acute Distress] : not in acute distress [de-identified] : Patient appears stated age in no acute respiratory distress. Patient is alert oriented x3. Patient has normal mood and affect. \par \par Bilateral knee exam\par Range of motion of the knee is 0-120°. \par Skin is normal. No rash.\par There is no effusion. No medial or lateral joint line tenderness. No swelling, no pitting edema.\par Overall alignment of the knee is then slight varus. Good anterior posterior stability. Firm endpoints on anterior and posterior drawer. \par Medial lateral stability is intact. Firm endpoint on medial and lateral stress testing .\par Odalys test is negative.\par Quadriceps strength 5/ 5. There is no loss of muscle volume in the thigh. \par Good anterior posterior and mediolateral stability.\par Sensation in the extremities intact. \par Discrimination is intact. Good DP and PT pulses.\par 		\par Bilateral hip exam\par On inspection of the hip shows skin is normal. No evidence of rash. \par No loss of muscle. Abductor strength is 5 out of 5. Hip flexor strength is 5.\par Range of motion of the hip at 90° flexion internal rotation is 15° external rotation is 30° pain-free. \par Hip has good stability in anterior and posterior direction. \par On lateral decubitus examination there is no tenderness in the greater trochanter. \par Lower Extremity Examination \par Bilateral lower extremity skin is normal. There is no rash. There is no edema and lymphadenopathy. DP and PT pulses intact. Sensation is intact.  [de-identified] :  2 view x-rays of right hip acquired showing good alignment of components.

## 2022-03-23 RX ORDER — AMOXICILLIN 500 MG/1
500 TABLET, FILM COATED ORAL
Qty: 4 | Refills: 0 | Status: ACTIVE | COMMUNITY
Start: 2022-03-23 | End: 1900-01-01

## 2022-04-18 ENCOUNTER — APPOINTMENT (OUTPATIENT)
Dept: OPHTHALMOLOGY | Facility: CLINIC | Age: 87
End: 2022-04-18
Payer: MEDICARE

## 2022-04-18 ENCOUNTER — NON-APPOINTMENT (OUTPATIENT)
Age: 87
End: 2022-04-18

## 2022-04-18 PROCEDURE — 92015 DETERMINE REFRACTIVE STATE: CPT

## 2022-04-18 PROCEDURE — 92014 COMPRE OPH EXAM EST PT 1/>: CPT

## 2022-07-15 ENCOUNTER — APPOINTMENT (OUTPATIENT)
Dept: ORTHOPEDIC SURGERY | Facility: CLINIC | Age: 87
End: 2022-07-15

## 2022-07-15 VITALS — HEIGHT: 63 IN | WEIGHT: 159 LBS | BODY MASS INDEX: 28.17 KG/M2

## 2022-07-15 DIAGNOSIS — Z96.641 PRESENCE OF RIGHT ARTIFICIAL HIP JOINT: ICD-10-CM

## 2022-07-15 PROCEDURE — 73502 X-RAY EXAM HIP UNI 2-3 VIEWS: CPT

## 2022-07-15 PROCEDURE — 99214 OFFICE O/P EST MOD 30 MIN: CPT

## 2022-09-02 ENCOUNTER — APPOINTMENT (OUTPATIENT)
Dept: ORTHOPEDIC SURGERY | Facility: CLINIC | Age: 87
End: 2022-09-02

## 2022-09-02 DIAGNOSIS — S86.819A STRAIN OF OTHER MUSCLE(S) AND TENDON(S) AT LOWER LEG LEVEL, UNSPECIFIED LEG, INITIAL ENCOUNTER: ICD-10-CM

## 2022-09-02 PROCEDURE — 73590 X-RAY EXAM OF LOWER LEG: CPT | Mod: RT

## 2022-09-02 PROCEDURE — 99214 OFFICE O/P EST MOD 30 MIN: CPT

## 2022-09-09 ENCOUNTER — APPOINTMENT (OUTPATIENT)
Dept: ULTRASOUND IMAGING | Facility: CLINIC | Age: 87
End: 2022-09-09

## 2022-09-09 ENCOUNTER — OUTPATIENT (OUTPATIENT)
Dept: OUTPATIENT SERVICES | Facility: HOSPITAL | Age: 87
LOS: 1 days | End: 2022-09-09
Payer: MEDICARE

## 2022-09-09 ENCOUNTER — RESULT REVIEW (OUTPATIENT)
Age: 87
End: 2022-09-09

## 2022-09-09 DIAGNOSIS — Z00.00 ENCOUNTER FOR GENERAL ADULT MEDICAL EXAMINATION WITHOUT ABNORMAL FINDINGS: ICD-10-CM

## 2022-09-09 DIAGNOSIS — Z98.890 OTHER SPECIFIED POSTPROCEDURAL STATES: Chronic | ICD-10-CM

## 2022-09-09 DIAGNOSIS — E89.0 POSTPROCEDURAL HYPOTHYROIDISM: Chronic | ICD-10-CM

## 2022-09-09 PROCEDURE — 93970 EXTREMITY STUDY: CPT

## 2022-09-09 PROCEDURE — 93970 EXTREMITY STUDY: CPT | Mod: 26

## 2022-09-14 ENCOUNTER — NON-APPOINTMENT (OUTPATIENT)
Age: 87
End: 2022-09-14

## 2022-11-07 ENCOUNTER — APPOINTMENT (OUTPATIENT)
Dept: OTOLARYNGOLOGY | Facility: CLINIC | Age: 87
End: 2022-11-07

## 2022-11-07 VITALS — WEIGHT: 155 LBS | HEIGHT: 63 IN | TEMPERATURE: 98.1 F | BODY MASS INDEX: 27.46 KG/M2

## 2022-11-07 DIAGNOSIS — H90.3 SENSORINEURAL HEARING LOSS, BILATERAL: ICD-10-CM

## 2022-11-07 DIAGNOSIS — H61.23 IMPACTED CERUMEN, BILATERAL: ICD-10-CM

## 2022-11-07 PROCEDURE — 99213 OFFICE O/P EST LOW 20 MIN: CPT | Mod: 25

## 2022-11-07 PROCEDURE — 69210 REMOVE IMPACTED EAR WAX UNI: CPT

## 2022-11-07 RX ORDER — COVID-19 MOLECULAR TEST ASSAY
KIT MISCELLANEOUS
Qty: 8 | Refills: 0 | Status: ACTIVE | COMMUNITY
Start: 2022-09-28

## 2022-11-07 RX ORDER — METFORMIN HYDROCHLORIDE 500 MG/1
500 TABLET, COATED ORAL
Qty: 45 | Refills: 0 | Status: ACTIVE | COMMUNITY
Start: 2022-09-23

## 2022-11-07 RX ORDER — VALACYCLOVIR 1 G/1
1 TABLET, FILM COATED ORAL
Qty: 21 | Refills: 0 | Status: ACTIVE | COMMUNITY
Start: 2022-10-19

## 2022-11-07 NOTE — HISTORY OF PRESENT ILLNESS
[No] : patient does not have a  history of radiation therapy [de-identified] : 89 y/o female w/ hx of b/l hearing loss with associated severe cerumen impaction x yrs who is here for her routine wax debridement. She states she has been doing well. She has no change in her hearing. She has no ENT complaints today. Pt has no ear pain, ear drainage,  tinnitus, vertigo, nasal congestion, nasal discharge, epistaxis, sinus infections, facial pain, facial pressure, throat pain, dysphagia or fevers\par  [FreeTextEntry1] : 11/7/2022\par c/o wax in ears and ear fullness x months\par no other modifying factors\par no nasal or throat complaints\par  [Tinnitus] : no tinnitus [Anxiety] : no anxiety [Dizziness] : no dizziness [Headache] : no headache [Hearing Loss] : hearing loss [Otalgia] : no otalgia [Otorrhea] : no otorrhea [Vertigo] : no vertigo [Loud Noise Exposure] : no history of loud noise exposure [Smoking] : no smoking [Early Onset Hearing Loss] : no early onset hearing loss [Stroke] : no stroke [None] : The patient is currently asymptomatic. [Ear Pain] : no ear pain [Ear Pressure] : no ear pressure [Diplopia] : no diplopia [Ear Fullness] : no ear fullness [Allergic Rhinitis] : no allergic rhinitis [Environmental Allergies] : no environmental allergies [Seasonal Allergies] : no seasonal allergies [Environmental Allergens] : no environmental allergens [Adenoidectomy] : no adenoidectomy [Allergies] : no allergies [Asthma] : no asthma [Neck Mass] : no neck mass [Neck Pain] : no neck pain [Chills] : no chills [Cold Intolerance] : no cold intolerance [Cough] : no cough [Fatigue] : no fatigue [Heat Intolerance] : no heat intolerance [Hyperthyroidism] : no hyperthyroidism [Sialadenitis] : no sialadenitis [Hodgkin Disease] : no hodgkin disease [Non-Hodgkin Lymphoma] : no non-hodgkin lymphoma [Tobacco Use] : no tobacco use [Alcohol Use] : no alcohol use [Graves Disease] : no graves disease [Thyroid Cancer] : no thyroid cancer

## 2022-11-07 NOTE — PHYSICAL EXAM
[de-identified] : bilat wax [] : septum deviated bilaterally [Midline] : trachea located in midline position [Normal] : salivary glands are normal

## 2022-11-07 NOTE — ASSESSMENT
[FreeTextEntry1] : 89 y/o female w/ b/l SNHL and excessive wax who is here fir her routine debridement. SHe has been doing well.\par \par Wax-\par After informed verbal consent is obtained, cerumen is removed from the right and left  ear canal with a curette and suction.\par Rx: \par Debrox was prescribed and  is to be placed in both ears on a routine basis to keep them free of wax.\par Routine debridement suggested to keep the ears free of wax.\par \par bilateral sensorineural hearing loss-cleared for hearing aids\par \par f/u prn--moving out of town\par

## 2022-12-02 ENCOUNTER — APPOINTMENT (OUTPATIENT)
Dept: ORTHOPEDIC SURGERY | Facility: CLINIC | Age: 87
End: 2022-12-02

## 2022-12-02 DIAGNOSIS — M17.0 BILATERAL PRIMARY OSTEOARTHRITIS OF KNEE: ICD-10-CM

## 2022-12-02 PROCEDURE — 99214 OFFICE O/P EST MOD 30 MIN: CPT | Mod: 25

## 2022-12-02 PROCEDURE — 20610 DRAIN/INJ JOINT/BURSA W/O US: CPT | Mod: 50

## 2023-04-10 NOTE — H&P PST ADULT - PATIENT ON (OXYGEN DELIVERY METHOD)
room air Otezla Counseling: The side effects of Otezla were discussed with the patient, including but not limited to worsening or new depression, weight loss, diarrhea, nausea, upper respiratory tract infection, and headache. Patient instructed to call the office should any adverse effect occur.  The patient verbalized understanding of the proper use and possible adverse effects of Otezla.  All the patient's questions and concerns were addressed.

## 2023-06-05 NOTE — DISCHARGE NOTE PROVIDER - NSRESEARCHGRANT_PROPHYLAXISRECOMFT_GEN_A_CORE
This is a surgical and/or non-medical patient. Eye Protection Verbiage: Before proceeding with the stage, a plastic scleral shield was inserted. The globe was anesthetized with a few drops of 1% lidocaine with 1:100,000 epinephrine. Then, an appropriate sized scleral shield was chosen and coated with lacrilube ointment. The shield was gently inserted and left in place for the duration of each stage. After the stage was completed, the shield was gently removed.